# Patient Record
Sex: MALE | Race: BLACK OR AFRICAN AMERICAN | Employment: UNEMPLOYED | URBAN - METROPOLITAN AREA
[De-identification: names, ages, dates, MRNs, and addresses within clinical notes are randomized per-mention and may not be internally consistent; named-entity substitution may affect disease eponyms.]

---

## 2022-02-11 ENCOUNTER — OFFICE VISIT (OUTPATIENT)
Dept: RHEUMATOLOGY | Facility: CLINIC | Age: 37
End: 2022-02-11
Payer: MEDICAID

## 2022-02-11 VITALS
HEIGHT: 76 IN | WEIGHT: 240 LBS | SYSTOLIC BLOOD PRESSURE: 139 MMHG | BODY MASS INDEX: 29.22 KG/M2 | DIASTOLIC BLOOD PRESSURE: 100 MMHG

## 2022-02-11 DIAGNOSIS — F17.200 SMOKING: ICD-10-CM

## 2022-02-11 DIAGNOSIS — M19.90 INFLAMMATORY ARTHRITIS: ICD-10-CM

## 2022-02-11 DIAGNOSIS — Z11.1 SCREENING FOR TUBERCULOSIS: ICD-10-CM

## 2022-02-11 DIAGNOSIS — Z51.81 THERAPEUTIC DRUG MONITORING: ICD-10-CM

## 2022-02-11 DIAGNOSIS — R06.00 DOE (DYSPNEA ON EXERTION): ICD-10-CM

## 2022-02-11 DIAGNOSIS — Z11.59 NEED FOR HEPATITIS B SCREENING TEST: ICD-10-CM

## 2022-02-11 DIAGNOSIS — I42.9 CARDIOMYOPATHY, UNSPECIFIED TYPE (HCC): ICD-10-CM

## 2022-02-11 DIAGNOSIS — M34.9 SCLERODERMA (HCC): Primary | ICD-10-CM

## 2022-02-11 PROCEDURE — 3075F SYST BP GE 130 - 139MM HG: CPT | Performed by: INTERNAL MEDICINE

## 2022-02-11 PROCEDURE — 99245 OFF/OP CONSLTJ NEW/EST HI 55: CPT | Performed by: INTERNAL MEDICINE

## 2022-02-11 PROCEDURE — 3080F DIAST BP >= 90 MM HG: CPT | Performed by: INTERNAL MEDICINE

## 2022-02-11 PROCEDURE — 3008F BODY MASS INDEX DOCD: CPT | Performed by: INTERNAL MEDICINE

## 2022-02-11 RX ORDER — MYCOPHENOLATE MOFETIL 500 MG/1
1000 TABLET ORAL 2 TIMES DAILY
COMMUNITY

## 2022-02-11 RX ORDER — AMLODIPINE BESYLATE 5 MG/1
10 TABLET ORAL DAILY
COMMUNITY
Start: 2021-03-30

## 2022-02-11 RX ORDER — PANTOPRAZOLE SODIUM 40 MG/1
40 TABLET, DELAYED RELEASE ORAL
Qty: 90 TABLET | Refills: 0 | Status: SHIPPED | OUTPATIENT
Start: 2022-02-11

## 2022-02-11 RX ORDER — AMLODIPINE BESYLATE 5 MG/1
5 TABLET ORAL DAILY
Qty: 90 TABLET | Refills: 1 | Status: SHIPPED | OUTPATIENT
Start: 2022-02-11 | End: 2022-03-11

## 2022-02-11 RX ORDER — LOSARTAN POTASSIUM AND HYDROCHLOROTHIAZIDE 12.5; 1 MG/1; MG/1
1 TABLET ORAL DAILY
COMMUNITY
Start: 2022-02-08 | End: 2022-03-11

## 2022-02-11 RX ORDER — HYDROXYCHLOROQUINE SULFATE 200 MG/1
TABLET, FILM COATED ORAL
Qty: 180 TABLET | Refills: 1 | Status: SHIPPED | OUTPATIENT
Start: 2022-02-11

## 2022-02-11 RX ORDER — MYCOPHENOLIC ACID 360 MG/1
1080 TABLET, DELAYED RELEASE ORAL
Qty: 180 TABLET | Refills: 0 | Status: SHIPPED | OUTPATIENT
Start: 2022-02-11

## 2022-02-11 NOTE — PATIENT INSTRUCTIONS
Schedule echocardiogram and CT-Chest at [facility] or [facility]. Pantroprazole 40 mg (for reflux): Take 2 hours after dinner. Amlodipine (for Raynauds): 5 mg. Start this next Tuesday. Hydroxychloroquine (plaquenil) for arthrits. start: Take 1 tab daily for 2 weeks, and if tolerating, then increase to 1 tab twice daily. Continue cellcept: 2 pills twice daily. We will try to change over to myfortic (same medication, but less diarrhea)    Stop smoking.     ==============================================================================================================  More reading about scleroderma:  -[employer] [facility] Scleroderma: https://www. rheumatology. org/I-Am-A/Patient-Caregiver/Diseases-Conditions/Scleroderma  -[facility] Scleroderma: https://www.desouza.info/  -National Scleroderma Foundation: https://scleroderma.org/find-your-path/

## 2022-02-14 ENCOUNTER — TELEPHONE (OUTPATIENT)
Dept: RHEUMATOLOGY | Facility: CLINIC | Age: 37
End: 2022-02-14

## 2022-02-14 DIAGNOSIS — M34.9 SCLERODERMA (HCC): ICD-10-CM

## 2022-02-14 DIAGNOSIS — Z51.81 THERAPEUTIC DRUG MONITORING: Primary | ICD-10-CM

## 2022-02-14 NOTE — TELEPHONE ENCOUNTER
Please call pt, labs stable. Will need repeat labs to monitor for cellcept drug toxicity prior to next RTC in 1 month.  Please fax over to [facility] or mail lab orders to patient      Labs from 2/8/2022    Creat 1.10, rest of CMP is normal  8.4 >14.9 <332    OPAL by IFA 1:80 speckled  TSH wnl

## 2022-02-14 NOTE — TELEPHONE ENCOUNTER
Can you let patient know to continue current medications, once myfortic is approved, he will stop cellcept. However it will take some time, so continue cellcept for the time being.

## 2022-02-15 ENCOUNTER — TELEPHONE (OUTPATIENT)
Dept: RHEUMATOLOGY | Facility: CLINIC | Age: 37
End: 2022-02-15

## 2022-02-16 NOTE — TELEPHONE ENCOUNTER
Pt returned call; reviewed notes per Dr Mendez Codding below. Pt verbalized understanding and agreed to this plan.

## 2022-02-17 ENCOUNTER — TELEPHONE (OUTPATIENT)
Dept: RHEUMATOLOGY | Facility: CLINIC | Age: 37
End: 2022-02-17

## 2022-02-17 LAB
ABSOLUTE BASOPHILS: 49 CELLS/UL (ref 0–200)
ABSOLUTE EOSINOPHILS: 74 CELLS/UL (ref 15–500)
ABSOLUTE LYMPHOCYTES: 2255 CELLS/UL (ref 850–3900)
ABSOLUTE MONOCYTES: 968 CELLS/UL (ref 200–950)
ABSOLUTE NEUTROPHILS: 4854 CELLS/UL (ref 1500–7800)
ALBUMIN/GLOBULIN RATIO: 1.4 (CALC) (ref 1–2.5)
ALBUMIN: 5 G/DL (ref 3.6–5.1)
ALKALINE PHOSPHATASE: 69 U/L (ref 36–130)
ALT: 19 U/L (ref 9–46)
AST: 23 U/L (ref 10–40)
BASOPHILS: 0.6 %
BILIRUBIN, TOTAL: 0.6 MG/DL (ref 0.2–1.2)
BILIRUBIN: NEGATIVE
BUN: 21 MG/DL (ref 7–25)
C-REACTIVE PROTEIN: 1.1 MG/L
CALCIUM: 11 MG/DL (ref 8.6–10.3)
CARBON DIOXIDE: 24 MMOL/L (ref 20–32)
CHLORIDE: 100 MMOL/L (ref 98–110)
COLOR: YELLOW
CREATINE KINASE, TOTAL: 346 U/L (ref 44–196)
CREATININE, RANDOM URINE: 162 MG/DL (ref 20–320)
CREATININE: 1.29 MG/DL (ref 0.6–1.35)
EGFR IF AFRICN AM: 82 ML/MIN/1.73M2
EGFR IF NONAFRICN AM: 70 ML/MIN/1.73M2
EOSINOPHILS: 0.9 %
GLOBULIN: 3.5 G/DL (CALC) (ref 1.9–3.7)
GLUCOSE: 85 MG/DL (ref 65–139)
GLUCOSE: NEGATIVE
HEMATOCRIT: 46.7 % (ref 38.5–50)
HEMOGLOBIN: 15.9 G/DL (ref 13.2–17.1)
LD: 136 U/L (ref 100–220)
LEUKOCYTE ESTERASE: NEGATIVE
LYMPHOCYTES: 27.5 %
MCH: 31.2 PG (ref 27–33)
MCHC: 34 G/DL (ref 32–36)
MCV: 91.7 FL (ref 80–100)
MONOCYTES: 11.8 %
MPV: 9.3 FL (ref 7.5–12.5)
NEUTROPHILS: 59.2 %
NITRITE: NEGATIVE
OCCULT BLOOD: NEGATIVE
PLATELET COUNT: 319 THOUSAND/UL (ref 140–400)
POTASSIUM: 4.1 MMOL/L (ref 3.5–5.3)
PROTEIN, TOTAL, RANDOM UR: 24 MG/DL (ref 5–25)
PROTEIN, TOTAL: 8.5 G/DL (ref 6.1–8.1)
RDW: 13 % (ref 11–15)
RED BLOOD CELL COUNT: 5.09 MILLION/UL (ref 4.2–5.8)
SED RATE BY MODIFIED$WESTERGREN: 6 MM/H
SODIUM: 135 MMOL/L (ref 135–146)
SPECIFIC GRAVITY: 1.01 (ref 1–1.03)
WHITE BLOOD CELL COUNT: 8.2 THOUSAND/UL (ref 3.8–10.8)

## 2022-02-17 NOTE — TELEPHONE ENCOUNTER
PA for CT chest started with [employer].  Clinicals required and faxed to [phone]  Case# [id number]

## 2022-02-21 NOTE — PROGRESS NOTES
Phoned [employer], our office still requesting PA form to complete myfortic. Sent to wrong fax number, will send again today.

## 2022-02-21 NOTE — TELEPHONE ENCOUNTER
Pa for CT chest approved per health plan. Effective 2/20/22-8/19/22  ID #[id number]  Phoned pt,LVM notified of approval and to call central scheduling to schedule.

## 2022-02-22 ENCOUNTER — TELEPHONE (OUTPATIENT)
Dept: RHEUMATOLOGY | Facility: CLINIC | Age: 37
End: 2022-02-22

## 2022-02-22 NOTE — TELEPHONE ENCOUNTER
Cesar Alas, patient is on mycophenolate mofetil already, but has significant diarrhea. Was trying to get mycophenolic acid (myfortic) approved, same medication, but different formulation. Much easier on the intestines. Trying to get 1080 mg BID dose approved. Does this not require an auth either? It usually does in my experience.

## 2022-03-02 ENCOUNTER — HOSPITAL ENCOUNTER (OUTPATIENT)
Dept: CT IMAGING | Facility: HOSPITAL | Age: 37
Discharge: HOME | End: 2022-03-02
Attending: INTERNAL MEDICINE
Payer: MEDICAID

## 2022-03-02 DIAGNOSIS — R06.00 DOE (DYSPNEA ON EXERTION): ICD-10-CM

## 2022-03-02 DIAGNOSIS — M34.9 SCLERODERMA (HCC): ICD-10-CM

## 2022-03-02 PROCEDURE — 71250 CT THORAX DX C-: CPT | Performed by: INTERNAL MEDICINE

## 2022-03-04 ENCOUNTER — HOSPITAL ENCOUNTER (OUTPATIENT)
Dept: CV DIAGNOSTICS | Facility: HOSPITAL | Age: 37
Discharge: HOME | End: 2022-03-04
Attending: INTERNAL MEDICINE
Payer: MEDICAID

## 2022-03-04 DIAGNOSIS — M34.9 SCLERODERMA (HCC): ICD-10-CM

## 2022-03-04 DIAGNOSIS — I42.9 CARDIOMYOPATHY, UNSPECIFIED TYPE (HCC): ICD-10-CM

## 2022-03-04 DIAGNOSIS — R06.00 DOE (DYSPNEA ON EXERTION): ICD-10-CM

## 2022-03-04 PROCEDURE — 93306 TTE W/DOPPLER COMPLETE: CPT | Performed by: INTERNAL MEDICINE

## 2022-03-04 NOTE — PROGRESS NOTES
Mild, small blebs likely more from smoking. Nothing to stress about. These changes are not from scleroderma.

## 2022-03-07 NOTE — PROGRESS NOTES
Great news, essentially normal ultrasound of the heart, will chat more about this at our next appointment.

## 2022-03-11 ENCOUNTER — OFFICE VISIT (OUTPATIENT)
Dept: RHEUMATOLOGY | Facility: CLINIC | Age: 37
End: 2022-03-11
Payer: MEDICAID

## 2022-03-11 VITALS
HEART RATE: 53 BPM | BODY MASS INDEX: 29.22 KG/M2 | OXYGEN SATURATION: 98 % | HEIGHT: 76 IN | WEIGHT: 240 LBS | SYSTOLIC BLOOD PRESSURE: 122 MMHG | DIASTOLIC BLOOD PRESSURE: 81 MMHG

## 2022-03-11 DIAGNOSIS — M34.9 SCLERODERMA (HCC): Primary | ICD-10-CM

## 2022-03-11 DIAGNOSIS — I73.00 RAYNAUD'S DISEASE WITHOUT GANGRENE: ICD-10-CM

## 2022-03-11 DIAGNOSIS — K21.9 GASTROESOPHAGEAL REFLUX DISEASE, UNSPECIFIED WHETHER ESOPHAGITIS PRESENT: ICD-10-CM

## 2022-03-11 DIAGNOSIS — I10 PRIMARY HYPERTENSION: ICD-10-CM

## 2022-03-11 DIAGNOSIS — Z51.81 THERAPEUTIC DRUG MONITORING: ICD-10-CM

## 2022-03-11 PROCEDURE — 3074F SYST BP LT 130 MM HG: CPT | Performed by: INTERNAL MEDICINE

## 2022-03-11 PROCEDURE — 99215 OFFICE O/P EST HI 40 MIN: CPT | Performed by: INTERNAL MEDICINE

## 2022-03-11 PROCEDURE — 3008F BODY MASS INDEX DOCD: CPT | Performed by: INTERNAL MEDICINE

## 2022-03-11 PROCEDURE — 3079F DIAST BP 80-89 MM HG: CPT | Performed by: INTERNAL MEDICINE

## 2022-03-11 RX ORDER — LOSARTAN POTASSIUM 100 MG/1
100 TABLET ORAL DAILY
Qty: 90 TABLET | Refills: 1 | Status: SHIPPED | OUTPATIENT
Start: 2022-03-11

## 2022-03-11 RX ORDER — AMLODIPINE BESYLATE 10 MG/1
10 TABLET ORAL DAILY
Qty: 90 TABLET | Refills: 1 | Status: SHIPPED | OUTPATIENT
Start: 2022-03-11 | End: 2023-03-06

## 2022-03-11 NOTE — PATIENT INSTRUCTIONS
-Call Dr. Francisca Gross about getting chantix. -repeat blood pressure and send us a message or call on March 18th    -Get repeat blood work (CMP and CBC) in early April at [facility]    -Amlodipine: increase to 10 mg daily, either 2x of the 5 mg tablets or 1 of the 10 mg tablets.  -Stop combo pill losartan-hydrochlorothiazide: start taking losartan only pill.  100 mg daily.   -cont hydroxychloroquine (plaquenil) 200 mg twice daily  -continue myfortic (mycophenolic sodium) 8391 mg (3 pills) in the morning and  1080 mg (3 pills) in the evening.   -continue protonix 40 mg daily.     -think about covid vaccine

## 2022-03-12 PROBLEM — I10 PRIMARY HYPERTENSION: Status: ACTIVE | Noted: 2022-03-12

## 2022-03-12 PROBLEM — M34.9 SCLERODERMA (HCC): Status: ACTIVE | Noted: 2022-03-12

## 2022-03-15 NOTE — PROGRESS NOTES
Phoned pt, pt has stopped losartan/hydrochlorothiazide and currently taking losartan 100 mg and amlopdipine 10 mg (increased from 5 mg) daily. Has not taken BP sent Sat. Reminded him to get daily Bps and log them. Pt BP on Sat was 128/81 and Friday was 115/86. Pt voiced understanding of today's call.

## 2022-04-06 RX ORDER — MYCOPHENOLIC ACID 360 MG/1
TABLET, DELAYED RELEASE ORAL
Qty: 180 TABLET | Refills: 0 | Status: SHIPPED | OUTPATIENT
Start: 2022-04-06 | End: 2022-04-11

## 2022-04-06 NOTE — TELEPHONE ENCOUNTER
LOV 3-11-22  Future Appointments   Date Time Provider Tom Denisse   5/17/2022 11:30 AM Angela Barrow MD [facility]

## 2022-04-06 NOTE — TELEPHONE ENCOUNTER
Called pt per notes below from Dr Edgar Burr; pt states he already scheduled an appt for labs next week.

## 2022-04-09 LAB
ABSOLUTE BASOPHILS: 49 CELLS/UL (ref 0–200)
ABSOLUTE EOSINOPHILS: 74 CELLS/UL (ref 15–500)
ABSOLUTE LYMPHOCYTES: 2288 CELLS/UL (ref 850–3900)
ABSOLUTE MONOCYTES: 869 CELLS/UL (ref 200–950)
ABSOLUTE NEUTROPHILS: 4920 CELLS/UL (ref 1500–7800)
ALBUMIN/GLOBULIN RATIO: 1.4 (CALC) (ref 1–2.5)
ALBUMIN: 4.6 G/DL (ref 3.6–5.1)
ALKALINE PHOSPHATASE: 63 U/L (ref 36–130)
ALT: 13 U/L (ref 9–46)
AST: 16 U/L (ref 10–40)
BASOPHILS: 0.6 %
BILIRUBIN, TOTAL: 0.4 MG/DL (ref 0.2–1.2)
BUN: 15 MG/DL (ref 7–25)
CALCIUM: 10.4 MG/DL (ref 8.6–10.3)
CARBON DIOXIDE: 25 MMOL/L (ref 20–32)
CHLORIDE: 106 MMOL/L (ref 98–110)
CREATININE: 1.12 MG/DL (ref 0.6–1.35)
EGFR IF AFRICN AM: 97 ML/MIN/1.73M2
EGFR IF NONAFRICN AM: 83 ML/MIN/1.73M2
EOSINOPHILS: 0.9 %
GLOBULIN: 3.2 G/DL (CALC) (ref 1.9–3.7)
GLUCOSE: 103 MG/DL (ref 65–139)
HEMATOCRIT: 42.3 % (ref 38.5–50)
HEMOGLOBIN: 14.3 G/DL (ref 13.2–17.1)
LYMPHOCYTES: 27.9 %
MCH: 31 PG (ref 27–33)
MCHC: 33.8 G/DL (ref 32–36)
MCV: 91.6 FL (ref 80–100)
MONOCYTES: 10.6 %
MPV: 9.4 FL (ref 7.5–12.5)
NEUTROPHILS: 60 %
PLATELET COUNT: 286 THOUSAND/UL (ref 140–400)
POTASSIUM: 3.8 MMOL/L (ref 3.5–5.3)
PROTEIN, TOTAL: 7.8 G/DL (ref 6.1–8.1)
RDW: 13.4 % (ref 11–15)
RED BLOOD CELL COUNT: 4.62 MILLION/UL (ref 4.2–5.8)
SODIUM: 141 MMOL/L (ref 135–146)
WHITE BLOOD CELL COUNT: 8.2 THOUSAND/UL (ref 3.8–10.8)

## 2022-04-11 ENCOUNTER — TELEPHONE (OUTPATIENT)
Dept: RHEUMATOLOGY | Facility: CLINIC | Age: 37
End: 2022-04-11

## 2022-04-11 RX ORDER — MYCOPHENOLIC ACID 360 MG/1
1080 TABLET, DELAYED RELEASE ORAL
Qty: 540 TABLET | Refills: 0 | Status: SHIPPED | OUTPATIENT
Start: 2022-04-11 | End: 2022-07-10

## 2022-04-12 NOTE — PROGRESS NOTES
Blood count, liver tests, kidney function are stable. Overall, labs are stable. Continue current mediactions, I refilled your myfortic.

## 2022-05-17 ENCOUNTER — OFFICE VISIT (OUTPATIENT)
Dept: RHEUMATOLOGY | Facility: CLINIC | Age: 37
End: 2022-05-17
Payer: MEDICAID

## 2022-05-17 VITALS
WEIGHT: 250 LBS | HEIGHT: 76 IN | HEART RATE: 100 BPM | SYSTOLIC BLOOD PRESSURE: 121 MMHG | DIASTOLIC BLOOD PRESSURE: 92 MMHG | OXYGEN SATURATION: 97 % | BODY MASS INDEX: 30.44 KG/M2

## 2022-05-17 DIAGNOSIS — K29.70 GASTRITIS WITHOUT BLEEDING, UNSPECIFIED CHRONICITY, UNSPECIFIED GASTRITIS TYPE: ICD-10-CM

## 2022-05-17 DIAGNOSIS — M34.9 SCLERODERMA (HCC): Primary | ICD-10-CM

## 2022-05-17 DIAGNOSIS — K21.9 GASTROESOPHAGEAL REFLUX DISEASE, UNSPECIFIED WHETHER ESOPHAGITIS PRESENT: ICD-10-CM

## 2022-05-17 DIAGNOSIS — M19.90 INFLAMMATORY ARTHRITIS: ICD-10-CM

## 2022-05-17 DIAGNOSIS — I10 PRIMARY HYPERTENSION: ICD-10-CM

## 2022-05-17 DIAGNOSIS — Z51.81 THERAPEUTIC DRUG MONITORING: ICD-10-CM

## 2022-05-17 PROCEDURE — 3008F BODY MASS INDEX DOCD: CPT | Performed by: INTERNAL MEDICINE

## 2022-05-17 PROCEDURE — 3080F DIAST BP >= 90 MM HG: CPT | Performed by: INTERNAL MEDICINE

## 2022-05-17 PROCEDURE — 99215 OFFICE O/P EST HI 40 MIN: CPT | Performed by: INTERNAL MEDICINE

## 2022-05-17 PROCEDURE — 3074F SYST BP LT 130 MM HG: CPT | Performed by: INTERNAL MEDICINE

## 2022-05-17 RX ORDER — PANTOPRAZOLE SODIUM 40 MG/1
40 TABLET, DELAYED RELEASE ORAL
Qty: 90 TABLET | Refills: 1 | Status: SHIPPED | OUTPATIENT
Start: 2022-05-17

## 2022-05-17 RX ORDER — HYDROXYCHLOROQUINE SULFATE 200 MG/1
TABLET, FILM COATED ORAL
Qty: 180 TABLET | Refills: 1 | Status: SHIPPED | OUTPATIENT
Start: 2022-05-17

## 2022-05-17 RX ORDER — AMLODIPINE BESYLATE 10 MG/1
10 TABLET ORAL DAILY
Qty: 90 TABLET | Refills: 1 | Status: SHIPPED | OUTPATIENT
Start: 2022-05-17 | End: 2022-11-13

## 2022-05-17 RX ORDER — LOSARTAN POTASSIUM 100 MG/1
100 TABLET ORAL DAILY
Qty: 90 TABLET | Refills: 1 | Status: SHIPPED | OUTPATIENT
Start: 2022-05-17

## 2022-05-17 RX ORDER — MYCOPHENOLIC ACID 360 MG/1
1080 TABLET, DELAYED RELEASE ORAL
Qty: 540 TABLET | Refills: 0 | Status: SHIPPED | OUTPATIENT
Start: 2022-05-17 | End: 2022-08-15

## 2022-05-17 NOTE — PATIENT INSTRUCTIONS
-Get repeat blood work (CMP and CBC) in mild/late June at [facility]  -cont hydroxychloroquine (plaquenil) 200 mg twice daily for inflammatory arthritis  -continue myfortic (mycophenolic sodium) 2789 mg (3 pills) in the morning and  1080 mg (3 pills) in the evening.    -continue protonix 40 mg daily for GERD  -continue Amlodipine 10 mg daily and Losartan 100 mg daily

## 2022-07-21 ENCOUNTER — TELEPHONE (OUTPATIENT)
Dept: RHEUMATOLOGY | Facility: CLINIC | Age: 37
End: 2022-07-21

## 2022-07-25 NOTE — TELEPHONE ENCOUNTER
Please call patient and get more information. I am not clear what a disability letter is. Does he need short or long term or SS disability?  Or FMLA

## 2022-07-25 NOTE — TELEPHONE ENCOUNTER
Phoned pt, currently not working- facility closed down. Pt states he is looking for social security benefits, medicaid previously denied. PCP advised him to contact our office.

## 2022-07-25 NOTE — TELEPHONE ENCOUNTER
Please format a [facility] msg and/or call patient with the following information. Patient should file for unemployment benefits below. Consider short-term and long-term disability if that is an option through previous employer. SSD process is usually through PCP, though I can help. The letter can help, but it depends where the patient is in the process. Has he already started this process? See below. General Process for filing for Social Security Disability:  http://azar-hernandez.info/. html    Forms needed for Social Security Disability:   [email or web address]    A Residual Functional Capacity (RFC) form will be needed to be completed. A physical therapist will need to assess this and provide the actual objective evaluation as our rheumatology clinic does not have the equipment to assess the [address]. For example, the [address] form asks if a patient can specifically lift 10, 20, 50 or 100 lbs or more.

## 2022-07-25 NOTE — TELEPHONE ENCOUNTER
Pt called again requesting disability letter from Dr Jose Luis Marsh. Has a new phone, lm on  if necessary.     LOV 5-17-22  Future Appointments   Date Time Provider Tom Salcedo   8/23/2022  1:30 PM Mortimer Mallick, MD [facility]

## 2022-08-22 ENCOUNTER — OFFICE VISIT (OUTPATIENT)
Dept: RHEUMATOLOGY | Facility: CLINIC | Age: 37
End: 2022-08-22
Payer: MEDICAID

## 2022-08-22 VITALS
OXYGEN SATURATION: 99 % | BODY MASS INDEX: 28.51 KG/M2 | DIASTOLIC BLOOD PRESSURE: 90 MMHG | TEMPERATURE: 98 F | HEART RATE: 92 BPM | SYSTOLIC BLOOD PRESSURE: 128 MMHG | WEIGHT: 234.13 LBS | HEIGHT: 76 IN

## 2022-08-22 DIAGNOSIS — M06.00 SERONEGATIVE RHEUMATOID ARTHRITIS (HCC): ICD-10-CM

## 2022-08-22 DIAGNOSIS — K29.70 GASTRITIS WITHOUT BLEEDING, UNSPECIFIED CHRONICITY, UNSPECIFIED GASTRITIS TYPE: ICD-10-CM

## 2022-08-22 DIAGNOSIS — Z51.81 THERAPEUTIC DRUG MONITORING: ICD-10-CM

## 2022-08-22 DIAGNOSIS — K21.9 GASTROESOPHAGEAL REFLUX DISEASE, UNSPECIFIED WHETHER ESOPHAGITIS PRESENT: ICD-10-CM

## 2022-08-22 DIAGNOSIS — I73.00 RAYNAUD'S DISEASE WITHOUT GANGRENE: ICD-10-CM

## 2022-08-22 DIAGNOSIS — M34.9 SCLERODERMA (HCC): Primary | ICD-10-CM

## 2022-08-22 RX ORDER — OMEPRAZOLE 40 MG/1
40 CAPSULE, DELAYED RELEASE ORAL
Qty: 90 CAPSULE | Refills: 3 | Status: SHIPPED | OUTPATIENT
Start: 2022-08-22 | End: 2023-08-17

## 2022-08-22 RX ORDER — LEFLUNOMIDE 20 MG/1
20 TABLET ORAL DAILY
Qty: 30 TABLET | Refills: 0 | Status: SHIPPED | OUTPATIENT
Start: 2022-08-22 | End: 2022-09-21

## 2022-08-22 RX ORDER — MYCOPHENOLIC ACID 360 MG/1
1080 TABLET, DELAYED RELEASE ORAL
Qty: 540 TABLET | Refills: 0 | OUTPATIENT
Start: 2022-08-22 | End: 2022-11-20

## 2022-08-22 NOTE — PATIENT INSTRUCTIONS
Start leflunomide: 0.5 tablets (10 mg total) by mouth 1 (one) time each day for 10 days, THEN 1 tablet (20 mg total) 1 (one) time each day. Get bloodwork (CMP, CBC w/ diff) 4 weeks after starting medication.  Watch out for elevated blood pressure and diarrhea with this medication.     -cont hydroxychloroquine (plaquenil) 200 mg twice daily for inflammatory arthritis  -continue myfortic (mycophenolic sodium) 1416 mg (3 pills) in the morning and  1080 mg (3 pills) in the evening.    -stop protonix 40 mg daily and start omeprazole 40 mg daily (30 minutes before breakfast) for GERD  -continue Amlodipine 10 mg daily and Losartan 100 mg daily

## 2022-09-21 ENCOUNTER — TELEPHONE (OUTPATIENT)
Dept: RHEUMATOLOGY | Facility: CLINIC | Age: 37
End: 2022-09-21

## 2022-09-21 DIAGNOSIS — M34.9 SCLERODERMA (HCC): ICD-10-CM

## 2022-09-21 DIAGNOSIS — K29.70 GASTRITIS WITHOUT BLEEDING, UNSPECIFIED CHRONICITY, UNSPECIFIED GASTRITIS TYPE: ICD-10-CM

## 2022-09-21 DIAGNOSIS — K21.9 GASTROESOPHAGEAL REFLUX DISEASE, UNSPECIFIED WHETHER ESOPHAGITIS PRESENT: ICD-10-CM

## 2022-09-21 RX ORDER — PANTOPRAZOLE SODIUM 40 MG/1
40 TABLET, DELAYED RELEASE ORAL
Qty: 90 TABLET | Refills: 1 | Status: SHIPPED | OUTPATIENT
Start: 2022-09-21

## 2022-09-21 NOTE — TELEPHONE ENCOUNTER
Future Appointments   Date Time Provider Tom Denisse   11/21/2022  2:00 PM Viktoria Prakash MD [facility] [place]     Last office visit 8/22/2022  Last labs taken 4/8/2022

## 2022-09-23 ENCOUNTER — TELEPHONE (OUTPATIENT)
Dept: RHEUMATOLOGY | Facility: CLINIC | Age: 37
End: 2022-09-23

## 2022-10-05 LAB
ABSOLUTE BASOPHILS: 70 CELLS/UL (ref 0–200)
ABSOLUTE EOSINOPHILS: 26 CELLS/UL (ref 15–500)
ABSOLUTE LYMPHOCYTES: 2112 CELLS/UL (ref 850–3900)
ABSOLUTE MONOCYTES: 1109 CELLS/UL (ref 200–950)
ABSOLUTE NEUTROPHILS: 5482 CELLS/UL (ref 1500–7800)
ALBUMIN/GLOBULIN RATIO: 1.5 (CALC) (ref 1–2.5)
ALBUMIN: 4.9 G/DL (ref 3.6–5.1)
ALKALINE PHOSPHATASE: 70 U/L (ref 36–130)
ALT: 19 U/L (ref 9–46)
AST: 20 U/L (ref 10–40)
BASOPHILS: 0.8 %
BILIRUBIN, TOTAL: 0.7 MG/DL (ref 0.2–1.2)
BUN: 15 MG/DL (ref 7–25)
CALCIUM: 10.9 MG/DL (ref 8.6–10.3)
CARBON DIOXIDE: 27 MMOL/L (ref 20–32)
CHLORIDE: 99 MMOL/L (ref 98–110)
CREATININE: 1.23 MG/DL (ref 0.6–1.26)
EGFR: 78 ML/MIN/1.73M2
EOSINOPHILS: 0.3 %
GLOBULIN: 3.2 G/DL (CALC) (ref 1.9–3.7)
GLUCOSE: 124 MG/DL (ref 65–99)
HEMATOCRIT: 46 % (ref 38.5–50)
HEMOGLOBIN: 15.5 G/DL (ref 13.2–17.1)
LYMPHOCYTES: 24 %
MCH: 30.5 PG (ref 27–33)
MCHC: 33.7 G/DL (ref 32–36)
MCV: 90.6 FL (ref 80–100)
MONOCYTES: 12.6 %
MPV: 9.6 FL (ref 7.5–12.5)
NEUTROPHILS: 62.3 %
PLATELET COUNT: 299 THOUSAND/UL (ref 140–400)
POTASSIUM: 3.7 MMOL/L (ref 3.5–5.3)
PROTEIN, TOTAL: 8.1 G/DL (ref 6.1–8.1)
RDW: 12.9 % (ref 11–15)
RED BLOOD CELL COUNT: 5.08 MILLION/UL (ref 4.2–5.8)
SODIUM: 136 MMOL/L (ref 135–146)
WHITE BLOOD CELL COUNT: 8.8 THOUSAND/UL (ref 3.8–10.8)

## 2022-10-25 DIAGNOSIS — M34.9 SCLERODERMA (HCC): ICD-10-CM

## 2022-10-25 DIAGNOSIS — M06.00 SERONEGATIVE RHEUMATOID ARTHRITIS (HCC): ICD-10-CM

## 2022-10-25 DIAGNOSIS — K29.70 GASTRITIS WITHOUT BLEEDING, UNSPECIFIED CHRONICITY, UNSPECIFIED GASTRITIS TYPE: ICD-10-CM

## 2022-10-25 RX ORDER — LEFLUNOMIDE 20 MG/1
TABLET ORAL
Qty: 30 TABLET | Refills: 0 | Status: SHIPPED | OUTPATIENT
Start: 2022-10-25

## 2022-10-25 NOTE — TELEPHONE ENCOUNTER
Future Appointments   Date Time Provider Tom Salcedo   11/21/2022  2:00 PM [facility] [place]     Last office visit 8/22/2022  Last labs taken 10/4/2022

## 2022-11-13 DIAGNOSIS — I10 PRIMARY HYPERTENSION: ICD-10-CM

## 2022-11-13 DIAGNOSIS — K29.70 GASTRITIS WITHOUT BLEEDING, UNSPECIFIED CHRONICITY, UNSPECIFIED GASTRITIS TYPE: ICD-10-CM

## 2022-11-13 DIAGNOSIS — M34.9 SCLERODERMA (HCC): ICD-10-CM

## 2022-11-14 RX ORDER — AMLODIPINE BESYLATE 10 MG/1
TABLET ORAL
Qty: 90 TABLET | Refills: 1 | Status: SHIPPED | OUTPATIENT
Start: 2022-11-14

## 2022-11-14 RX ORDER — LOSARTAN POTASSIUM 100 MG/1
TABLET ORAL
Qty: 90 TABLET | Refills: 1 | Status: SHIPPED | OUTPATIENT
Start: 2022-11-14

## 2022-11-14 NOTE — TELEPHONE ENCOUNTER
Future Appointments   Date Time Provider Tom Denisse   11/21/2022  2:00 PM Rupa Fuller MD [facility] [place]     LOV 8/22/22  Labs completed 10/4/22

## 2022-11-21 ENCOUNTER — OFFICE VISIT (OUTPATIENT)
Dept: RHEUMATOLOGY | Facility: CLINIC | Age: 37
End: 2022-11-21
Payer: MEDICAID

## 2022-11-21 VITALS
HEART RATE: 96 BPM | BODY MASS INDEX: 28.62 KG/M2 | RESPIRATION RATE: 14 BRPM | DIASTOLIC BLOOD PRESSURE: 72 MMHG | TEMPERATURE: 98 F | OXYGEN SATURATION: 96 % | SYSTOLIC BLOOD PRESSURE: 118 MMHG | HEIGHT: 76 IN | WEIGHT: 235 LBS

## 2022-11-21 DIAGNOSIS — Z51.81 THERAPEUTIC DRUG MONITORING: ICD-10-CM

## 2022-11-21 DIAGNOSIS — K29.70 GASTRITIS WITHOUT BLEEDING, UNSPECIFIED CHRONICITY, UNSPECIFIED GASTRITIS TYPE: ICD-10-CM

## 2022-11-21 DIAGNOSIS — M06.00 SERONEGATIVE RHEUMATOID ARTHRITIS (HCC): ICD-10-CM

## 2022-11-21 DIAGNOSIS — R06.09 DOE (DYSPNEA ON EXERTION): ICD-10-CM

## 2022-11-21 DIAGNOSIS — M34.9 SCLERODERMA (HCC): Primary | ICD-10-CM

## 2022-11-21 DIAGNOSIS — I73.00 RAYNAUD'S DISEASE WITHOUT GANGRENE: ICD-10-CM

## 2022-11-21 PROCEDURE — 3078F DIAST BP <80 MM HG: CPT | Performed by: INTERNAL MEDICINE

## 2022-11-21 PROCEDURE — 3074F SYST BP LT 130 MM HG: CPT | Performed by: INTERNAL MEDICINE

## 2022-11-21 PROCEDURE — 99215 OFFICE O/P EST HI 40 MIN: CPT | Performed by: INTERNAL MEDICINE

## 2022-11-21 PROCEDURE — 3008F BODY MASS INDEX DOCD: CPT | Performed by: INTERNAL MEDICINE

## 2022-11-21 RX ORDER — UREA 40 %
1 CREAM (GRAM) TOPICAL DAILY PRN
COMMUNITY
Start: 2022-07-14

## 2022-11-21 RX ORDER — MYCOPHENOLIC ACID 360 MG/1
1080 TABLET, DELAYED RELEASE ORAL
COMMUNITY

## 2022-11-21 RX ORDER — HYDROXYCHLOROQUINE SULFATE 200 MG/1
TABLET, FILM COATED ORAL
Qty: 180 TABLET | Refills: 1 | Status: SHIPPED | OUTPATIENT
Start: 2022-11-21

## 2022-11-21 RX ORDER — LEFLUNOMIDE 20 MG/1
20 TABLET ORAL DAILY
Qty: 90 TABLET | Refills: 0 | Status: SHIPPED | OUTPATIENT
Start: 2022-11-21

## 2022-11-21 NOTE — PATIENT INSTRUCTIONS
-get repeat blood work in early Jan 2023  -Get repeat pulmonary functions tests in Feb 2023  -Schedule an eye exam with ophthalmologist in your network, tell them you are on hydroxychloroquine (plaquenil) . -Look into Prilosec [omeprazole] (very similar to pantoprazole) over-the-counter at [facility].    -Get Covid vaccine at [facility]    -continue leflunomide 20 mg daily  -cont hydroxychloroquine (plaquenil) 200 mg twice daily for inflammatory arthritis  -continue myfortic (mycophenolic sodium) 6831 mg (3 pills) in the morning and  1080 mg (3 pills) in the evening.    -continue Amlodipine 10 mg daily and Losartan 100 mg daily   -stop protonix 40 mg daily (since not covered any more by insurance) and start omeprazole 40 mg daily (30 minutes before breakfast) for GERD  -daily blood pressure

## 2023-02-16 LAB
ABSOLUTE BASOPHILS: 40 CELLS/UL (ref 0–200)
ABSOLUTE EOSINOPHILS: 72 CELLS/UL (ref 15–500)
ABSOLUTE LYMPHOCYTES: 1672 CELLS/UL (ref 850–3900)
ABSOLUTE MONOCYTES: 1096 CELLS/UL (ref 200–950)
ABSOLUTE NEUTROPHILS: 5120 CELLS/UL (ref 1500–7800)
ALBUMIN/GLOBULIN RATIO: 2 (CALC) (ref 1–2.5)
ALBUMIN: 4.9 G/DL (ref 3.6–5.1)
ALKALINE PHOSPHATASE: 63 U/L (ref 36–130)
ALT: 15 U/L (ref 9–46)
APPEARANCE: CLEAR
AST: 20 U/L (ref 10–40)
BASOPHILS: 0.5 %
BILIRUBIN, TOTAL: 0.5 MG/DL (ref 0.2–1.2)
BILIRUBIN: NEGATIVE
BUN/CREATININE RATIO: 11 (CALC) (ref 6–22)
BUN: 14 MG/DL (ref 7–25)
C-REACTIVE PROTEIN: 2.1 MG/L
CALCIUM: 11 MG/DL (ref 8.6–10.3)
CARBON DIOXIDE: 30 MMOL/L (ref 20–32)
CHLORIDE: 103 MMOL/L (ref 98–110)
COLOR: YELLOW
CREATINE KINASE, TOTAL: 354 U/L (ref 44–196)
CREATININE, RANDOM URINE: 190 MG/DL (ref 20–320)
CREATININE: 1.27 MG/DL (ref 0.6–1.26)
EGFR: 74 ML/MIN/1.73M2
EOSINOPHILS: 0.9 %
GLOBULIN: 2.5 G/DL (CALC) (ref 1.9–3.7)
GLUCOSE: 85 MG/DL (ref 65–99)
GLUCOSE: NEGATIVE
HEMATOCRIT: 45.4 % (ref 38.5–50)
HEMOGLOBIN: 14.7 G/DL (ref 13.2–17.1)
LEUKOCYTE ESTERASE: NEGATIVE
LYMPHOCYTES: 20.9 %
MCH: 29.2 PG (ref 27–33)
MCHC: 32.4 G/DL (ref 32–36)
MCV: 90.1 FL (ref 80–100)
MONOCYTES: 13.7 %
MPV: 9.3 FL (ref 7.5–12.5)
NEUTROPHILS: 64 %
NITRITE: NEGATIVE
OCCULT BLOOD: NEGATIVE
PH: 5.5 (ref 5–8)
PLATELET COUNT: 362 THOUSAND/UL (ref 140–400)
POTASSIUM: 4.1 MMOL/L (ref 3.5–5.3)
PROTEIN, TOTAL, RANDOM UR: 12 MG/DL (ref 5–25)
PROTEIN, TOTAL: 7.4 G/DL (ref 6.1–8.1)
PROTEIN: NEGATIVE
RDW: 13.1 % (ref 11–15)
RED BLOOD CELL COUNT: 5.04 MILLION/UL (ref 4.2–5.8)
SED RATE BY MODIFIED$WESTERGREN: 28 MM/H
SODIUM: 142 MMOL/L (ref 135–146)
SPECIFIC GRAVITY: 1.02 (ref 1–1.03)
WHITE BLOOD CELL COUNT: 8 THOUSAND/UL (ref 3.8–10.8)

## 2023-02-22 DIAGNOSIS — M06.00 SERONEGATIVE RHEUMATOID ARTHRITIS (HCC): ICD-10-CM

## 2023-02-22 DIAGNOSIS — M34.9 SCLERODERMA (HCC): ICD-10-CM

## 2023-02-22 DIAGNOSIS — K29.70 GASTRITIS WITHOUT BLEEDING, UNSPECIFIED CHRONICITY, UNSPECIFIED GASTRITIS TYPE: ICD-10-CM

## 2023-02-22 RX ORDER — LEFLUNOMIDE 20 MG/1
TABLET ORAL
Qty: 90 TABLET | Refills: 0 | Status: SHIPPED | OUTPATIENT
Start: 2023-02-22

## 2023-02-22 NOTE — TELEPHONE ENCOUNTER
Future Appointments   Date Time Provider Tom Salcedo   2/27/2023  4:00 PM Mortimer Mallick, MD [facility] [place]     LOV  11/21/2022    Last fill  11/21/2022  90 tabs, 0 refills

## 2023-02-27 ENCOUNTER — OFFICE VISIT (OUTPATIENT)
Dept: RHEUMATOLOGY | Facility: CLINIC | Age: 38
End: 2023-02-27
Payer: MEDICAID

## 2023-02-27 VITALS
RESPIRATION RATE: 16 BRPM | OXYGEN SATURATION: 98 % | TEMPERATURE: 98 F | WEIGHT: 224.38 LBS | DIASTOLIC BLOOD PRESSURE: 82 MMHG | SYSTOLIC BLOOD PRESSURE: 108 MMHG | BODY MASS INDEX: 27.32 KG/M2 | HEART RATE: 72 BPM | HEIGHT: 76 IN

## 2023-02-27 DIAGNOSIS — Z51.81 THERAPEUTIC DRUG MONITORING: ICD-10-CM

## 2023-02-27 DIAGNOSIS — K29.70 GASTRITIS WITHOUT BLEEDING, UNSPECIFIED CHRONICITY, UNSPECIFIED GASTRITIS TYPE: ICD-10-CM

## 2023-02-27 DIAGNOSIS — M34.9 SCLERODERMA (HCC): ICD-10-CM

## 2023-02-27 DIAGNOSIS — R06.09 DOE (DYSPNEA ON EXERTION): ICD-10-CM

## 2023-02-27 DIAGNOSIS — Z79.899 LONG-TERM USE OF HYDROXYCHLOROQUINE: Primary | ICD-10-CM

## 2023-02-27 DIAGNOSIS — G62.9 NEUROPATHY: ICD-10-CM

## 2023-02-27 PROCEDURE — 3074F SYST BP LT 130 MM HG: CPT | Performed by: INTERNAL MEDICINE

## 2023-02-27 PROCEDURE — 3008F BODY MASS INDEX DOCD: CPT | Performed by: INTERNAL MEDICINE

## 2023-02-27 PROCEDURE — 99214 OFFICE O/P EST MOD 30 MIN: CPT | Performed by: INTERNAL MEDICINE

## 2023-02-27 PROCEDURE — 3079F DIAST BP 80-89 MM HG: CPT | Performed by: INTERNAL MEDICINE

## 2023-02-27 RX ORDER — PANTOPRAZOLE SODIUM 40 MG/1
40 TABLET, DELAYED RELEASE ORAL
Qty: 90 TABLET | Refills: 3 | Status: SHIPPED | OUTPATIENT
Start: 2023-02-27

## 2023-02-27 NOTE — PATIENT INSTRUCTIONS
Ophthalmology [facility] doctor)  -Dr. Veronica Yadav  [facility], [address]  Tel: [phone]  Fax: [phone]    [facility]  Dr. Bay Bedoya Number: [phone]    [facility] eye clinic.  Please call to schedule: phone: [phone] or [phone] (in [facility]).     ==============================================================================================================    Saee if [facility] has cheaper Omeprazole or pantoprazole

## 2023-04-24 DIAGNOSIS — I10 PRIMARY HYPERTENSION: ICD-10-CM

## 2023-04-24 DIAGNOSIS — K29.70 GASTRITIS WITHOUT BLEEDING, UNSPECIFIED CHRONICITY, UNSPECIFIED GASTRITIS TYPE: ICD-10-CM

## 2023-04-24 DIAGNOSIS — M34.9 SCLERODERMA (HCC): ICD-10-CM

## 2023-04-24 RX ORDER — LOSARTAN POTASSIUM 100 MG/1
TABLET ORAL
Qty: 90 TABLET | Refills: 1 | Status: SHIPPED | OUTPATIENT
Start: 2023-04-24

## 2023-04-24 NOTE — TELEPHONE ENCOUNTER
Future Appointments   Date Time Provider Tom Denisse   6/26/2023 11:30 AM Francisca Henderson MD [facility] [place]     LOV  2/27/2023    Last refill  11/14/2022  90 tabs, 1 refill

## 2023-05-17 ENCOUNTER — TELEPHONE (OUTPATIENT)
Dept: RHEUMATOLOGY | Facility: CLINIC | Age: 38
End: 2023-05-17

## 2023-05-17 DIAGNOSIS — K29.70 GASTRITIS WITHOUT BLEEDING, UNSPECIFIED CHRONICITY, UNSPECIFIED GASTRITIS TYPE: ICD-10-CM

## 2023-05-17 DIAGNOSIS — M06.00 SERONEGATIVE RHEUMATOID ARTHRITIS (HCC): ICD-10-CM

## 2023-05-17 DIAGNOSIS — M34.9 SCLERODERMA (HCC): ICD-10-CM

## 2023-05-17 LAB
ABSOLUTE BASOPHILS: 59 CELLS/UL (ref 0–200)
ABSOLUTE EOSINOPHILS: 73 CELLS/UL (ref 15–500)
ABSOLUTE LYMPHOCYTES: 1630 CELLS/UL (ref 850–3900)
ABSOLUTE MONOCYTES: 931 CELLS/UL (ref 200–950)
ABSOLUTE NEUTROPHILS: 3907 CELLS/UL (ref 1500–7800)
ALBUMIN/GLOBULIN RATIO: 1.5 (CALC) (ref 1–2.5)
ALBUMIN: 4.5 G/DL (ref 3.6–5.1)
ALKALINE PHOSPHATASE: 72 U/L (ref 36–130)
ALT: 27 U/L (ref 9–46)
APPEARANCE: CLEAR
AST: 34 U/L (ref 10–40)
BASOPHILS: 0.9 %
BILIRUBIN, TOTAL: 0.5 MG/DL (ref 0.2–1.2)
BILIRUBIN: NEGATIVE
BUN: 13 MG/DL (ref 7–25)
CALCIUM: 10.2 MG/DL (ref 8.6–10.3)
CARBON DIOXIDE: 26 MMOL/L (ref 20–32)
CHLORIDE: 104 MMOL/L (ref 98–110)
COLOR: YELLOW
CREATININE: 1.09 MG/DL (ref 0.6–1.26)
EGFR: 89 ML/MIN/1.73M2
EOSINOPHILS: 1.1 %
GLOBULIN: 3.1 G/DL (CALC) (ref 1.9–3.7)
GLUCOSE: 100 MG/DL (ref 65–139)
GLUCOSE: NEGATIVE
HEMATOCRIT: 42.4 % (ref 38.5–50)
HEMOGLOBIN: 14.5 G/DL (ref 13.2–17.1)
LEUKOCYTE ESTERASE: NEGATIVE
LYMPHOCYTES: 24.7 %
MCH: 30.7 PG (ref 27–33)
MCHC: 34.2 G/DL (ref 32–36)
MCV: 89.8 FL (ref 80–100)
MONOCYTES: 14.1 %
MPV: 9.1 FL (ref 7.5–12.5)
NEUTROPHILS: 59.2 %
NITRITE: NEGATIVE
OCCULT BLOOD: NEGATIVE
PLATELET COUNT: 311 THOUSAND/UL (ref 140–400)
POTASSIUM: 3.7 MMOL/L (ref 3.5–5.3)
PROTEIN, TOTAL: 7.6 G/DL (ref 6.1–8.1)
RDW: 13.7 % (ref 11–15)
RED BLOOD CELL COUNT: 4.72 MILLION/UL (ref 4.2–5.8)
SODIUM: 141 MMOL/L (ref 135–146)
SPECIFIC GRAVITY: 1.02 (ref 1–1.03)
WHITE BLOOD CELL COUNT: 6.6 THOUSAND/UL (ref 3.8–10.8)

## 2023-05-17 RX ORDER — LEFLUNOMIDE 20 MG/1
20 TABLET ORAL DAILY
Qty: 90 TABLET | Refills: 0 | Status: SHIPPED | OUTPATIENT
Start: 2023-05-17

## 2023-05-17 RX ORDER — MYCOPHENOLIC ACID 360 MG/1
1080 TABLET, DELAYED RELEASE ORAL
Qty: 540 TABLET | Refills: 0 | Status: SHIPPED | OUTPATIENT
Start: 2023-05-17 | End: 2023-08-15

## 2023-06-29 ENCOUNTER — TELEPHONE (OUTPATIENT)
Dept: RHEUMATOLOGY | Facility: CLINIC | Age: 38
End: 2023-06-29

## 2023-08-02 ENCOUNTER — HOSPITAL ENCOUNTER (OUTPATIENT)
Dept: CV DIAGNOSTICS | Facility: HOSPITAL | Age: 38
Discharge: HOME | End: 2023-08-02
Attending: INTERNAL MEDICINE
Payer: MEDICAID

## 2023-08-02 ENCOUNTER — RT VISIT (OUTPATIENT)
Dept: RESPIRATORY THERAPY | Facility: HOSPITAL | Age: 38
End: 2023-08-02
Attending: INTERNAL MEDICINE
Payer: MEDICAID

## 2023-08-02 DIAGNOSIS — Z51.81 THERAPEUTIC DRUG MONITORING: ICD-10-CM

## 2023-08-02 DIAGNOSIS — R06.09 DOE (DYSPNEA ON EXERTION): ICD-10-CM

## 2023-08-02 DIAGNOSIS — M34.9 SCLERODERMA (HCC): ICD-10-CM

## 2023-08-02 PROCEDURE — 94729 DIFFUSING CAPACITY: CPT | Performed by: INTERNAL MEDICINE

## 2023-08-02 PROCEDURE — 94010 BREATHING CAPACITY TEST: CPT | Performed by: INTERNAL MEDICINE

## 2023-08-02 PROCEDURE — 93306 TTE W/DOPPLER COMPLETE: CPT | Performed by: INTERNAL MEDICINE

## 2023-08-02 PROCEDURE — 94726 PLETHYSMOGRAPHY LUNG VOLUMES: CPT | Performed by: INTERNAL MEDICINE

## 2023-08-03 PROBLEM — R06.09 DOE (DYSPNEA ON EXERTION): Status: ACTIVE | Noted: 2023-08-03

## 2023-08-03 NOTE — PROCEDURES
Findings:  FEV1 is 3.48L, 80% predicted. FVC is 4.12L, 76% predicted. FEV1/ FVC ratio is 0.85. The flow-volume loop demonstrates a normal pattern. The TLC is 6.11L, 73% predicted. The residual volume 1.98L, 93% predicted. The diffusion capacity is 63% predicted and 102% predicted when corrected for alveolar volume. Impression:  There is no airway obstruction on spirometry and visualized on flow-volume loop. There is mild restriction with total lung capacity of 6.11L, 73% predicted. This can be seen in heart failure, fluid overload states, interstitial lung disease, thoracic spine/chest disorders, obesity as well as other clinical scenarios. Further clinical correlation required. Diffusion capacity is mildly reduced with DLCO of 63% but improves to normal when considering alveolar volume. This may represent a normal finding but can be seen in emphysema, interstitial lung disease, pulmonary vascular disease (such as pulmonary hypertension) and anemia. If not already performed, would suggest further evaluation as determined clinically. There are no previous pulmonary function tests available for comparison.

## 2023-08-15 DIAGNOSIS — K29.70 GASTRITIS WITHOUT BLEEDING, UNSPECIFIED CHRONICITY, UNSPECIFIED GASTRITIS TYPE: ICD-10-CM

## 2023-08-15 DIAGNOSIS — M34.9 SCLERODERMA (HCC): ICD-10-CM

## 2023-08-15 DIAGNOSIS — M06.00 SERONEGATIVE RHEUMATOID ARTHRITIS (HCC): ICD-10-CM

## 2023-08-15 RX ORDER — MYCOPHENOLIC ACID 360 MG/1
1080 TABLET, DELAYED RELEASE ORAL
Qty: 540 TABLET | Refills: 0 | Status: SHIPPED | OUTPATIENT
Start: 2023-08-15

## 2023-08-15 NOTE — TELEPHONE ENCOUNTER
Future Appointments   Date Time Provider Tom Salcedo   8/28/2023 11:00 AM Carolina Quiros MD [facility]     Last office visit: 2/27/2023    Last fill: 5/17/2023 540 tab, 0 refills    Last labs: 5/17/2023

## 2023-08-26 DIAGNOSIS — K21.9 GASTROESOPHAGEAL REFLUX DISEASE, UNSPECIFIED WHETHER ESOPHAGITIS PRESENT: ICD-10-CM

## 2023-08-26 DIAGNOSIS — M34.9 SCLERODERMA (HCC): ICD-10-CM

## 2023-08-26 DIAGNOSIS — K29.70 GASTRITIS WITHOUT BLEEDING, UNSPECIFIED CHRONICITY, UNSPECIFIED GASTRITIS TYPE: ICD-10-CM

## 2023-08-28 ENCOUNTER — OFFICE VISIT (OUTPATIENT)
Dept: RHEUMATOLOGY | Facility: CLINIC | Age: 38
End: 2023-08-28
Payer: MEDICAID

## 2023-08-28 VITALS
SYSTOLIC BLOOD PRESSURE: 148 MMHG | HEART RATE: 78 BPM | BODY MASS INDEX: 27.25 KG/M2 | HEIGHT: 76 IN | DIASTOLIC BLOOD PRESSURE: 94 MMHG | TEMPERATURE: 98 F | WEIGHT: 223.81 LBS | OXYGEN SATURATION: 96 %

## 2023-08-28 DIAGNOSIS — K21.9 GASTROESOPHAGEAL REFLUX DISEASE, UNSPECIFIED WHETHER ESOPHAGITIS PRESENT: ICD-10-CM

## 2023-08-28 DIAGNOSIS — I10 PRIMARY HYPERTENSION: ICD-10-CM

## 2023-08-28 DIAGNOSIS — M34.9 SCLERODERMA (HCC): Primary | ICD-10-CM

## 2023-08-28 DIAGNOSIS — L98.491 SKIN ULCER OF FINGER, LIMITED TO BREAKDOWN OF SKIN (HCC): ICD-10-CM

## 2023-08-28 DIAGNOSIS — M06.00 SERONEGATIVE RHEUMATOID ARTHRITIS (HCC): ICD-10-CM

## 2023-08-28 DIAGNOSIS — K29.70 GASTRITIS WITHOUT BLEEDING, UNSPECIFIED CHRONICITY, UNSPECIFIED GASTRITIS TYPE: ICD-10-CM

## 2023-08-28 DIAGNOSIS — I73.00 RAYNAUD'S DISEASE WITHOUT GANGRENE: ICD-10-CM

## 2023-08-28 LAB
BILIRUB UR QL STRIP.AUTO: NEGATIVE
CREAT UR-SCNC: 294 MG/DL
GLUCOSE UR STRIP.AUTO-MCNC: NORMAL MG/DL
HYALINE CASTS #/AREA URNS AUTO: PRESENT /LPF
KETONES UR STRIP.AUTO-MCNC: NEGATIVE MG/DL
LEUKOCYTE ESTERASE UR QL STRIP.AUTO: NEGATIVE
NITRITE UR QL STRIP.AUTO: NEGATIVE
PH UR STRIP.AUTO: 5.5 [PH] (ref 5–8)
PROT UR STRIP.AUTO-MCNC: 30 MG/DL
PROT UR-MCNC: 63.6 MG/DL
RBC UR QL AUTO: NEGATIVE
SP GR UR STRIP.AUTO: 1.02 (ref 1–1.03)
UROBILINOGEN UR STRIP.AUTO-MCNC: NORMAL MG/DL

## 2023-08-28 PROCEDURE — 82570 ASSAY OF URINE CREATININE: CPT | Performed by: INTERNAL MEDICINE

## 2023-08-28 PROCEDURE — 81001 URINALYSIS AUTO W/SCOPE: CPT | Performed by: INTERNAL MEDICINE

## 2023-08-28 PROCEDURE — 84156 ASSAY OF PROTEIN URINE: CPT | Performed by: INTERNAL MEDICINE

## 2023-08-28 PROCEDURE — 3080F DIAST BP >= 90 MM HG: CPT | Performed by: INTERNAL MEDICINE

## 2023-08-28 PROCEDURE — 99215 OFFICE O/P EST HI 40 MIN: CPT | Performed by: INTERNAL MEDICINE

## 2023-08-28 PROCEDURE — 3077F SYST BP >= 140 MM HG: CPT | Performed by: INTERNAL MEDICINE

## 2023-08-28 PROCEDURE — 3008F BODY MASS INDEX DOCD: CPT | Performed by: INTERNAL MEDICINE

## 2023-08-28 RX ORDER — PANTOPRAZOLE SODIUM 40 MG/1
40 TABLET, DELAYED RELEASE ORAL
Qty: 90 TABLET | Refills: 1 | Status: SHIPPED | OUTPATIENT
Start: 2023-08-28

## 2023-08-28 RX ORDER — AMLODIPINE BESYLATE 10 MG/1
10 TABLET ORAL DAILY
Qty: 90 TABLET | Refills: 3 | Status: SHIPPED | OUTPATIENT
Start: 2023-08-28 | End: 2024-08-22

## 2023-08-28 RX ORDER — PANTOPRAZOLE SODIUM 40 MG/1
TABLET, DELAYED RELEASE ORAL
Qty: 90 TABLET | Refills: 1 | Status: SHIPPED | OUTPATIENT
Start: 2023-08-28 | End: 2023-08-28

## 2023-08-28 RX ORDER — LEFLUNOMIDE 20 MG/1
20 TABLET ORAL DAILY
Qty: 90 TABLET | Refills: 0 | Status: SHIPPED | OUTPATIENT
Start: 2023-08-28

## 2023-08-28 RX ORDER — HYDROXYCHLOROQUINE SULFATE 200 MG/1
TABLET, FILM COATED ORAL
Qty: 180 TABLET | Refills: 1 | Status: SHIPPED | OUTPATIENT
Start: 2023-08-28

## 2023-08-28 RX ORDER — LOSARTAN POTASSIUM 100 MG/1
100 TABLET ORAL DAILY
Qty: 90 TABLET | Refills: 1 | Status: SHIPPED | OUTPATIENT
Start: 2023-08-28

## 2023-08-28 NOTE — TELEPHONE ENCOUNTER
Future Appointments   Date Time Provider Tom Denisse   8/28/2023 11:00 AM Faizan Carranza MD [facility] [place] [facility]     Last office visit: 2/27/2023    Last fill: 2/27/2023 90 tab, 3 refills

## 2023-08-28 NOTE — PATIENT INSTRUCTIONS
-get blood work now.   -restart amlodipine 10 mg daily for Raynauds and digital ulcers  -continue leflunomide 20 mg daily  -cont hydroxychloroquine (plaquenil) 200 mg twice daily for inflammatory arthritis  -continue myfortic (mycophenolic sodium) 4505 mg (3 pills) in the morning and  1080 mg (3 pills) in the evening.    -For Raynaud's: Continue Amlodipine 10 mg daily and Losartan 100 mg daily

## 2023-08-29 ENCOUNTER — TELEPHONE (OUTPATIENT)
Dept: RHEUMATOLOGY | Facility: CLINIC | Age: 38
End: 2023-08-29

## 2023-08-29 NOTE — TELEPHONE ENCOUNTER
PA start    Prior authorization for:     Medication form: pantoproazole 40mg daily    Submission method: covermymeds    Date submitted: 8/29/23    Tracking #: [id number]    QF-TB result: n/a

## 2023-08-30 ENCOUNTER — TELEPHONE (OUTPATIENT)
Dept: RHEUMATOLOGY | Facility: CLINIC | Age: 38
End: 2023-08-30

## 2023-08-31 LAB
ABSOLUTE BASOPHILS: 48 CELLS/UL (ref 0–200)
ABSOLUTE EOSINOPHILS: 88 CELLS/UL (ref 15–500)
ABSOLUTE LYMPHOCYTES: 2352 CELLS/UL (ref 850–3900)
ABSOLUTE MONOCYTES: 896 CELLS/UL (ref 200–950)
ABSOLUTE NEUTROPHILS: 4616 CELLS/UL (ref 1500–7800)
ALBUMIN/GLOBULIN RATIO: 1.6 (CALC) (ref 1–2.5)
ALBUMIN: 4.5 G/DL (ref 3.6–5.1)
ALKALINE PHOSPHATASE: 71 U/L (ref 36–130)
ALT: 12 U/L (ref 9–46)
AST: 20 U/L (ref 10–40)
BASOPHILS: 0.6 %
BILIRUBIN, TOTAL: 0.3 MG/DL (ref 0.2–1.2)
BUN: 13 MG/DL (ref 7–25)
CALCIUM: 10 MG/DL (ref 8.6–10.3)
CARBON DIOXIDE: 24 MMOL/L (ref 20–32)
CHLORIDE: 107 MMOL/L (ref 98–110)
CREATININE: 1.14 MG/DL (ref 0.6–1.26)
EGFR: 84 ML/MIN/1.73M2
EOSINOPHILS: 1.1 %
GLOBULIN: 2.8 G/DL (CALC) (ref 1.9–3.7)
GLUCOSE: 90 MG/DL (ref 65–99)
HEMATOCRIT: 43.7 % (ref 38.5–50)
HEMOGLOBIN: 14.3 G/DL (ref 13.2–17.1)
LYMPHOCYTES: 29.4 %
MCH: 30.3 PG (ref 27–33)
MCHC: 32.7 G/DL (ref 32–36)
MCV: 92.6 FL (ref 80–100)
MONOCYTES: 11.2 %
MPV: 9.5 FL (ref 7.5–12.5)
NEUTROPHILS: 57.7 %
PLATELET COUNT: 367 THOUSAND/UL (ref 140–400)
POTASSIUM: 3.6 MMOL/L (ref 3.5–5.3)
PROTEIN, TOTAL: 7.3 G/DL (ref 6.1–8.1)
RDW: 13.5 % (ref 11–15)
RED BLOOD CELL COUNT: 4.72 MILLION/UL (ref 4.2–5.8)
SODIUM: 143 MMOL/L (ref 135–146)
WHITE BLOOD CELL COUNT: 8 THOUSAND/UL (ref 3.8–10.8)

## 2023-09-01 NOTE — TELEPHONE ENCOUNTER
Called [facility]:  [phone]  Refaxed with doctored note today. Marylen Lusher, MD5 hours ago (8:18 AM)        Please message pharmacy plan back     Patient with diffuse scleroderma and esophageal dysmotility resulting in GERD. Chronic PPI therapy is mandated to prevent aspiration/chemical pneumonitis. Will update last progress note. Zulema Jasso, RNYesterday (11:41 AM)     Denied for quantity limit. They require documentation with support as to why he needs to take this medication longer than they duration limit set by the pharmacy plan.           Note

## 2023-09-11 NOTE — TELEPHONE ENCOUNTER
Checked on status of appeal/additional information. 9/1 approved: pantoprazole up to 60 tabs a month.  Faxing approval.     PA Approved    Prior authorization for: pantoprazole    Medication form: 40mg tab    Date received:     Approval #: [id number]    Approved dates: 6/3/2023- 12/30/2023    Pharmacy for medication: [facility]    QF-TB results:

## 2023-11-13 DIAGNOSIS — K29.70 GASTRITIS WITHOUT BLEEDING, UNSPECIFIED CHRONICITY, UNSPECIFIED GASTRITIS TYPE: ICD-10-CM

## 2023-11-13 DIAGNOSIS — M34.9 SCLERODERMA (HCC): ICD-10-CM

## 2023-11-13 DIAGNOSIS — M06.00 SERONEGATIVE RHEUMATOID ARTHRITIS (HCC): ICD-10-CM

## 2023-11-13 RX ORDER — MYCOPHENOLIC ACID 360 MG/1
TABLET, DELAYED RELEASE ORAL
Qty: 540 TABLET | Refills: 0 | Status: SHIPPED | OUTPATIENT
Start: 2023-11-13

## 2023-11-13 NOTE — TELEPHONE ENCOUNTER
Last office visit: 8/28/2023    Next Rheum Apt:11/28/2023 Donna De La Torre MD    Last fill: 8/15/2023   540 tabs, 0 refills     Labs:   Lab Results   Component Value Date    CREATSERUM 1.15 09/07/2023    ALKPHO 71 08/30/2023    AST 20 08/30/2023    ALT 12 08/30/2023    BILT 0.3 08/30/2023    TP 7.3 08/30/2023    ALB 4.5 08/30/2023       Lab Results   Component Value Date    WBC 8.0 08/30/2023    HGB 14.3 08/30/2023     08/30/2023    NEPERCENT 57.7 08/30/2023    LYPERCENT 29.4 08/30/2023    NE 4,616 08/30/2023    LYMABS 2,352 08/30/2023

## 2023-11-28 ENCOUNTER — OFFICE VISIT (OUTPATIENT)
Dept: RHEUMATOLOGY | Facility: CLINIC | Age: 38
End: 2023-11-28
Payer: MEDICAID

## 2023-11-28 VITALS
BODY MASS INDEX: 26.84 KG/M2 | HEIGHT: 76 IN | DIASTOLIC BLOOD PRESSURE: 82 MMHG | RESPIRATION RATE: 16 BRPM | SYSTOLIC BLOOD PRESSURE: 110 MMHG | OXYGEN SATURATION: 98 % | TEMPERATURE: 97 F | HEART RATE: 89 BPM | WEIGHT: 220.38 LBS

## 2023-11-28 DIAGNOSIS — K29.70 GASTRITIS WITHOUT BLEEDING, UNSPECIFIED CHRONICITY, UNSPECIFIED GASTRITIS TYPE: ICD-10-CM

## 2023-11-28 DIAGNOSIS — I73.00 RAYNAUD'S DISEASE WITHOUT GANGRENE: ICD-10-CM

## 2023-11-28 DIAGNOSIS — M34.9 SCLERODERMA (HCC): ICD-10-CM

## 2023-11-28 DIAGNOSIS — I10 PRIMARY HYPERTENSION: ICD-10-CM

## 2023-11-28 DIAGNOSIS — M06.00 SERONEGATIVE RHEUMATOID ARTHRITIS (HCC): ICD-10-CM

## 2023-11-28 DIAGNOSIS — L94.3 SCLERODACTYLY: Primary | ICD-10-CM

## 2023-11-28 PROCEDURE — 3074F SYST BP LT 130 MM HG: CPT | Performed by: INTERNAL MEDICINE

## 2023-11-28 PROCEDURE — 99215 OFFICE O/P EST HI 40 MIN: CPT | Performed by: INTERNAL MEDICINE

## 2023-11-28 PROCEDURE — 3008F BODY MASS INDEX DOCD: CPT | Performed by: INTERNAL MEDICINE

## 2023-11-28 PROCEDURE — 3079F DIAST BP 80-89 MM HG: CPT | Performed by: INTERNAL MEDICINE

## 2023-11-28 RX ORDER — HYDROXYCHLOROQUINE SULFATE 200 MG/1
TABLET, FILM COATED ORAL
Qty: 180 TABLET | Refills: 1 | Status: SHIPPED | OUTPATIENT
Start: 2023-11-28

## 2023-11-28 RX ORDER — AMLODIPINE BESYLATE 10 MG/1
10 TABLET ORAL DAILY
Qty: 90 TABLET | Refills: 3 | Status: SHIPPED | OUTPATIENT
Start: 2023-11-28 | End: 2024-11-22

## 2023-11-28 RX ORDER — KETOCONAZOLE 20 MG/G
CREAM TOPICAL
Qty: 30 G | Refills: 2 | Status: SHIPPED | OUTPATIENT
Start: 2023-11-28

## 2023-11-28 NOTE — PATIENT INSTRUCTIONS
Ketoconazole Use twice daily x 14 days now and in April 2024    -get blood work now.   For monitoring of leflunomide and Myfortic.  -continue amlodipine 10 mg daily for Raynauds and digital ulcers  -apply vaseline overlying ulcers.  -Continue losartan 100 mg for Raynaud's  -continue leflunomide 20 mg daily  -cont hydroxychloroquine (plaquenil) 200 mg twice daily for inflammatory arthritis  -continue myfortic (mycophenolic sodium) 3868 mg (3 pills) in the morning and  1080 mg (3 pills) in the evening.    -continue pantoprazole for Ssc GERD;

## 2023-11-30 ENCOUNTER — TELEPHONE (OUTPATIENT)
Dept: RHEUMATOLOGY | Facility: CLINIC | Age: 38
End: 2023-11-30

## 2023-11-30 DIAGNOSIS — M34.9 SCLERODERMA (HCC): ICD-10-CM

## 2023-11-30 DIAGNOSIS — R82.993 HIGH URINE URIC ACID LEVEL: Primary | ICD-10-CM

## 2023-11-30 DIAGNOSIS — M25.50 ARTHRALGIA, UNSPECIFIED JOINT: ICD-10-CM

## 2023-11-30 LAB
ABSOLUTE BASOPHILS: 62 CELLS/UL (ref 0–200)
ABSOLUTE EOSINOPHILS: 47 CELLS/UL (ref 15–500)
ABSOLUTE LYMPHOCYTES: 1856 CELLS/UL (ref 850–3900)
ABSOLUTE MONOCYTES: 913 CELLS/UL (ref 200–950)
ABSOLUTE NEUTROPHILS: 4922 CELLS/UL (ref 1500–7800)
ALBUMIN/GLOBULIN RATIO: 1.6 (CALC) (ref 1–2.5)
ALBUMIN: 4.4 G/DL (ref 3.6–5.1)
ALKALINE PHOSPHATASE: 59 U/L (ref 36–130)
ALT: 12 U/L (ref 9–46)
AST: 16 U/L (ref 10–40)
BASOPHILS: 0.8 %
BILIRUBIN, TOTAL: 0.4 MG/DL (ref 0.2–1.2)
BILIRUBIN: NEGATIVE
BUN: 11 MG/DL (ref 7–25)
CALCIUM: 9.7 MG/DL (ref 8.6–10.3)
CARBON DIOXIDE: 26 MMOL/L (ref 20–32)
CHLORIDE: 107 MMOL/L (ref 98–110)
COLOR: YELLOW
CREATINE KINASE, TOTAL: 284 U/L (ref 44–196)
CREATININE: 0.98 MG/DL (ref 0.6–1.26)
EGFR: 101 ML/MIN/1.73M2
EOSINOPHILS: 0.6 %
GLOBULIN: 2.7 G/DL (CALC) (ref 1.9–3.7)
GLUCOSE: 95 MG/DL (ref 65–139)
GLUCOSE: NEGATIVE
HEMATOCRIT: 42.5 % (ref 38.5–50)
HEMOGLOBIN: 14.3 G/DL (ref 13.2–17.1)
KETONES: NEGATIVE
LEUKOCYTE ESTERASE: NEGATIVE
LYMPHOCYTES: 23.8 %
MCH: 30.3 PG (ref 27–33)
MCHC: 33.6 G/DL (ref 32–36)
MCV: 90 FL (ref 80–100)
MONOCYTES: 11.7 %
MPV: 9.3 FL (ref 7.5–12.5)
NEUTROPHILS: 63.1 %
NITRITE: NEGATIVE
OCCULT BLOOD: NEGATIVE
PLATELET COUNT: 289 THOUSAND/UL (ref 140–400)
POTASSIUM: 3.9 MMOL/L (ref 3.5–5.3)
PROTEIN, TOTAL: 7.1 G/DL (ref 6.1–8.1)
RDW: 13.3 % (ref 11–15)
RED BLOOD CELL COUNT: 4.72 MILLION/UL (ref 4.2–5.8)
SODIUM: 141 MMOL/L (ref 135–146)
SPECIFIC GRAVITY: 1.02 (ref 1–1.03)
WHITE BLOOD CELL COUNT: 7.8 THOUSAND/UL (ref 3.8–10.8)

## 2023-12-03 DIAGNOSIS — M34.9 SCLERODERMA (HCC): ICD-10-CM

## 2023-12-03 DIAGNOSIS — K29.70 GASTRITIS WITHOUT BLEEDING, UNSPECIFIED CHRONICITY, UNSPECIFIED GASTRITIS TYPE: ICD-10-CM

## 2023-12-03 DIAGNOSIS — M06.00 SERONEGATIVE RHEUMATOID ARTHRITIS (HCC): ICD-10-CM

## 2023-12-04 RX ORDER — LEFLUNOMIDE 20 MG/1
20 TABLET ORAL DAILY
Qty: 90 TABLET | Refills: 0 | Status: SHIPPED | OUTPATIENT
Start: 2023-12-04

## 2023-12-04 NOTE — TELEPHONE ENCOUNTER
LOV:    11/28/2023      Future Appointments   Date Time Provider Tom Salcedo   3/1/2024  1:00 PM Viktoria Prakash MD [facility] [place]       LF:    08/28/2023     QTY:    90        Refills:     0    LABS:        Component      Latest Ref Rng 11/29/2023   WBC      3.8 - 10.8 Thousand/uL 7.8    RBC      4.20 - 5.80 Million/uL 4.72    Hemoglobin      13.2 - 17.1 g/dL 14.3    Hematocrit      38.5 - 50.0 % 42.5    MCV      80.0 - 100.0 fL 90.0    MCH      27.0 - 33.0 pg 30.3    MCHC      32.0 - 36.0 g/dL 33.6    RDW      11.0 - 15.0 % 13.3    Platelet Count      100 - 400 Thousand/uL 289    MPV      7.5 - 12.5 fL 9.3    Neutrophils Absolute      1,500 - 7,800 cells/uL 4,922    Lymphocytes Absolute      850 - 3,900 cells/uL 1,856    Monocytes Absolute      200 - 950 cells/uL 913    Eosinophils Absolute      15 - 500 cells/uL 47    Basophils Absolute      0 - 200 cells/uL 62    Neutrophils %      % 63.1    Lymphocytes %      % 23.8    Monocytes %      % 11.7    Eosinophils %      % 0.6    Basophils %      % 0.8    Glucose      65 - 139 mg/dL 95    BUN      7 - 25 mg/dL 11    CREATININE      0.60 - 1.26 mg/dL 0.98    EGFR      > OR = 60 mL/min/1.73m2 101    BUN/CREATININE RATIO      6 - 22 (calc) SEE NOTE:    Sodium      135 - 146 mmol/L 141    Potassium      3.5 - 5.3 mmol/L 3.9    Chloride      98 - 110 mmol/L 107    Carbon Dioxide, Total      20 - 32 mmol/L 26    CALCIUM      8.6 - 10.3 mg/dL 9.7    PROTEIN, TOTAL      6.1 - 8.1 g/dL 7.1    Albumin      3.6 - 5.1 g/dL 4.4    Globulin      1.9 - 3.7 g/dL (calc) 2.7    A/G Ratio      1.0 - 2.5 (calc) 1.6    Total Bilirubin      0.2 - 1.2 mg/dL 0.4    Alkaline Phosphatase      36 - 130 U/L 59    AST (SGOT)      10 - 40 U/L 16    ALT (SGPT)      9 - 46 U/L 12

## 2024-02-13 DIAGNOSIS — K29.70 GASTRITIS WITHOUT BLEEDING, UNSPECIFIED CHRONICITY, UNSPECIFIED GASTRITIS TYPE: ICD-10-CM

## 2024-02-13 DIAGNOSIS — M06.00 SERONEGATIVE RHEUMATOID ARTHRITIS (HCC): ICD-10-CM

## 2024-02-13 DIAGNOSIS — M34.9 SCLERODERMA (HCC): ICD-10-CM

## 2024-02-13 RX ORDER — MYCOPHENOLIC ACID 360 MG/1
TABLET, DELAYED RELEASE ORAL
Qty: 540 TABLET | Refills: 0 | Status: SHIPPED | OUTPATIENT
Start: 2024-02-13

## 2024-02-13 NOTE — TELEPHONE ENCOUNTER
Last office visit: 11/28/2023    Next Rheum Apt:3/1/2024 Judd Douglas MD    Last fill: 11/13/2023 540 tab, 0 refills    Labs:   Lab Results   Component Value Date    CREATSERUM 0.98 11/29/2023    ALKPHO 59 11/29/2023    AST 16 11/29/2023    ALT 12 11/29/2023    BILT 0.4 11/29/2023    TP 7.1 11/29/2023    ALB 4.4 11/29/2023       Lab Results   Component Value Date    WBC 7.8 11/29/2023    HGB 14.3 11/29/2023     11/29/2023    NEPERCENT 63.1 11/29/2023    LYPERCENT 23.8 11/29/2023    NE 4,922 11/29/2023    LYMABS 1,856 11/29/2023

## 2024-02-21 DIAGNOSIS — M06.00 SERONEGATIVE RHEUMATOID ARTHRITIS (HCC): ICD-10-CM

## 2024-02-21 DIAGNOSIS — M34.9 SCLERODERMA (HCC): ICD-10-CM

## 2024-02-21 DIAGNOSIS — K21.9 GASTROESOPHAGEAL REFLUX DISEASE, UNSPECIFIED WHETHER ESOPHAGITIS PRESENT: ICD-10-CM

## 2024-02-21 DIAGNOSIS — K29.70 GASTRITIS WITHOUT BLEEDING, UNSPECIFIED CHRONICITY, UNSPECIFIED GASTRITIS TYPE: ICD-10-CM

## 2024-02-22 ENCOUNTER — TELEPHONE (OUTPATIENT)
Dept: RHEUMATOLOGY | Facility: CLINIC | Age: 39
End: 2024-02-22

## 2024-02-22 RX ORDER — PANTOPRAZOLE SODIUM 40 MG/1
40 TABLET, DELAYED RELEASE ORAL
Qty: 90 TABLET | Refills: 1 | Status: SHIPPED | OUTPATIENT
Start: 2024-02-22

## 2024-02-22 NOTE — TELEPHONE ENCOUNTER
Future Appointments   Date Time Provider Department Center   3/1/2024  1:00 PM Judd Douglas MD [facility] [place]     Last office visit: 11/28/2023    Last fill: 8/28/2023 90 tab, 1 refill

## 2024-02-27 DIAGNOSIS — M06.00 SERONEGATIVE RHEUMATOID ARTHRITIS (HCC): ICD-10-CM

## 2024-02-27 DIAGNOSIS — K29.70 GASTRITIS WITHOUT BLEEDING, UNSPECIFIED CHRONICITY, UNSPECIFIED GASTRITIS TYPE: ICD-10-CM

## 2024-02-27 DIAGNOSIS — M34.9 SCLERODERMA (HCC): ICD-10-CM

## 2024-02-27 RX ORDER — LEFLUNOMIDE 20 MG/1
20 TABLET ORAL DAILY
Qty: 90 TABLET | Refills: 0 | Status: SHIPPED | OUTPATIENT
Start: 2024-02-27

## 2024-02-27 NOTE — TELEPHONE ENCOUNTER
Last office visit: 11/28/2023    Next Rheum Apt:3/1/2024 Judd Douglas MD    Last fill: 12/4/2023  90 tabs, 0 refills     Labs:   Lab Results   Component Value Date    CREATSERUM 0.98 11/29/2023    ALKPHO 59 11/29/2023    AST 16 11/29/2023    ALT 12 11/29/2023    BILT 0.4 11/29/2023    TP 7.1 11/29/2023    ALB 4.4 11/29/2023       Lab Results   Component Value Date    WBC 7.8 11/29/2023    HGB 14.3 11/29/2023     11/29/2023    NEPERCENT 63.1 11/29/2023    LYPERCENT 23.8 11/29/2023    NE 4,922 11/29/2023    LYMABS 1,856 11/29/2023

## 2024-03-01 ENCOUNTER — OFFICE VISIT (OUTPATIENT)
Dept: RHEUMATOLOGY | Facility: CLINIC | Age: 39
End: 2024-03-01
Payer: MEDICAID

## 2024-03-01 VITALS
DIASTOLIC BLOOD PRESSURE: 76 MMHG | WEIGHT: 222.81 LBS | HEIGHT: 76 IN | TEMPERATURE: 98 F | RESPIRATION RATE: 16 BRPM | SYSTOLIC BLOOD PRESSURE: 102 MMHG | HEART RATE: 92 BPM | OXYGEN SATURATION: 97 % | BODY MASS INDEX: 27.13 KG/M2

## 2024-03-01 DIAGNOSIS — M94.0 COSTOCHONDRITIS: ICD-10-CM

## 2024-03-01 DIAGNOSIS — K21.9 GASTROESOPHAGEAL REFLUX DISEASE, UNSPECIFIED WHETHER ESOPHAGITIS PRESENT: ICD-10-CM

## 2024-03-01 DIAGNOSIS — Z51.81 THERAPEUTIC DRUG MONITORING: ICD-10-CM

## 2024-03-01 DIAGNOSIS — M06.00 SERONEGATIVE RHEUMATOID ARTHRITIS (HCC): ICD-10-CM

## 2024-03-01 DIAGNOSIS — K29.70 GASTRITIS WITHOUT BLEEDING, UNSPECIFIED CHRONICITY, UNSPECIFIED GASTRITIS TYPE: ICD-10-CM

## 2024-03-01 DIAGNOSIS — L98.491 SKIN ULCER OF FINGER, LIMITED TO BREAKDOWN OF SKIN (HCC): ICD-10-CM

## 2024-03-01 DIAGNOSIS — I73.00 RAYNAUD'S DISEASE WITHOUT GANGRENE: ICD-10-CM

## 2024-03-01 DIAGNOSIS — M34.9 SCLERODERMA (HCC): Primary | ICD-10-CM

## 2024-03-01 PROCEDURE — 99215 OFFICE O/P EST HI 40 MIN: CPT | Performed by: INTERNAL MEDICINE

## 2024-03-01 RX ORDER — SILDENAFIL CITRATE 20 MG/1
20 TABLET ORAL 3 TIMES DAILY
Qty: 270 TABLET | Refills: 0 | Status: SHIPPED | OUTPATIENT
Start: 2024-03-01 | End: 2024-03-01

## 2024-03-01 RX ORDER — PANTOPRAZOLE SODIUM 40 MG/1
40 TABLET, DELAYED RELEASE ORAL
Qty: 90 TABLET | Refills: 1 | Status: SHIPPED | OUTPATIENT
Start: 2024-03-01

## 2024-03-01 RX ORDER — SILDENAFIL CITRATE 20 MG/1
20 TABLET ORAL 3 TIMES DAILY
Qty: 270 TABLET | Refills: 0 | Status: SHIPPED | OUTPATIENT
Start: 2024-03-01

## 2024-03-01 NOTE — PROGRESS NOTES
Rheumatology f/u Patient Note  ====================================================================================================  Chief complaint: Scleroderma  ?  PCP  JILLIAN BUTCHER II    =====================================================================================================  HPI 2/2022    Stefan Johnson JrArabella is a 38 year old male   Prior Pulmonary (Dr. Johnson)   Prior Rheum (Dr. Amor, [facility])  Patient here to establish care for scleroderma.  Initially was diagnosed with diffuse scleroderma in January 2020 at [facility].  He was found to have Raynaud's (since end 2018), sclerodactyly, diffuse skin thickening, puffy fingers, GERD, and digital ulcers.  Serologies were consistent with a positive OPAL at 1: 320 and RNA polymerase III and he was started on mycophenolate for skin thickening.  In 2021: He had an echocardiogram which showed an EF of 41% and no TR.  He had PFTs that showed a restrictive defect but normal DLCO.  Immunosuppression:  MMF 1500mg PO BID (started 01/2020, increased to 1500mg in 06/2020, ran out 09/2020-03/2021 because insurance lapse). He has restarted CellCept just 3 days ago after seeing his PCP.  Previously worked as a   Still smoking 2 cigarillos each day.. Previously on chantix, this did not help  EtOH: 2 beers/day.  Used to smoke cannabis.  Stopped  CARVAJAL after moderate activity.   Daughters: 17, 14, 6.   Previously a .   Diarrhea on cellcept.   GERD- bad, used to be on protonix  Raynauds- active  Constipation- none  Skin thickening-yes  Oral ulcers- no  Digital ulcers- in the past, last in 2020  Kidney problems-no. Takes BP  Joint pains/swelling-no  Medications:  MMF 1 gm BID  Losartan 100 mg/HCTZ 12.5 mg  ==============================================================================================================  Visit: 11/28/23  More skin hypopigmentation on the arms in Sept 2023.  Started after weed whacking in the first week mowing the lawn.   Significant sun exposure at that time.  Workup unremarkable home prior to the rash developing.  -July/August: had rash on the dorsum of hand, put arnica on the arm. This helped significantly    Medication:   hydroxychloroquine (plaquenil) 200 mg BID .   myfortic 1080 mg BID   Amlodipine 10 mg daily  Losartan 100 mg  leflunomide 20 mg daily  GERD- controlled  Raynauds-much better after restarting amlodipine  Constipation-good  Skin thickening-still with significant dorsal hand thickening and sclerodactyly  Oral ulcers-none  Digital ulcers-improving  Kidney problems-none  Joint pains/swelling-none  ==============================================================================================================  Today's Visit: 03/01/24    Raynaud's still active.  Better on amlodipine 10 mg daily.  Right PIP 5 digital ulcer almost healed.  However left PIP 5 digital ulcer was healing but then worsened recently.  Patient still smoking 1 cigarette a day.  Trying to quit.  Motivated, not in the smoking cessation clinic yet.  Ulcers developing overlying the dorsum of the bilateral toes.  Still with reproducible chest pain.  CT of the chest completed 2 weeks ago was normal.  No ILD noted.    Medication:   hydroxychloroquine (plaquenil) 200 mg BID .   myfortic 1080 mg BID   Amlodipine 10 mg daily  Losartan 100 mg  leflunomide 20 mg daily    GERD- ok, but insurance stopped covering Protonix 1 week ago.  Ran out of the medication.  Raynauds- active as noted above  Constipation- none  Skin thickening-stable  Oral ulcers- none  Digital ulcers- yes as noted above, not completed healed   Kidney problems-no  Joint pains/swelling-none      5 point ROS negative except noted above  I had reviewed past medical and family histories together with allergy and medication lists documented.    Medications:  Outpatient Medications Marked as Taking for the 3/1/24 encounter (Office Visit) with Judd Douglas MD   Medication Sig Dispense Refill     sildenafil 20 MG Oral Tab Take 1 tablet (20 mg total) by mouth 3 (three) times daily. 270 tablet 0    pantoprazole 40 MG Oral Tab EC Take 1 tablet (40 mg total) by mouth After dinner. Take 2 hours after dinner 90 tablet 1    leflunomide 20 MG Oral Tab Take 1 tablet (20 mg total) by mouth daily. 90 tablet 0    mycophenolate sodium 360 MG Oral Tab EC TAKE 3 TABLETS BY MOUTH TWICE DAILY BEFORE MEALS 540 tablet 0    ketoconazole 2 % External Cream Use twice daily x 14 days now and in April 2024 for skin fungal infection 30 g 2    amLODIPine 10 MG Oral Tab Take 1 tablet (10 mg total) by mouth daily. 90 tablet 3    hydroxychloroquine 200 MG Oral Tab Hydroxychloroquine (plaquenil): 1 tab twice daily. 180 tablet 1    metoprolol tartrate 25 MG Oral Tab Take 1 tablet (25 mg total) by mouth 2 (two) times daily.      losartan 100 MG Oral Tab Take 1 tablet (100 mg total) by mouth daily. 90 tablet 1     Modified Medications    Modified Medication Previous Medication    PANTOPRAZOLE 40 MG ORAL TAB EC pantoprazole 40 MG Oral Tab EC       Take 1 tablet (40 mg total) by mouth After dinner. Take 2 hours after dinner    Take 1 tablet (40 mg total) by mouth After dinner. Take 2 hours after dinner     Medications Discontinued During This Encounter   Medication Reason    sildenafil 20 MG Oral Tab     pantoprazole 40 MG Oral Tab EC        ?  Allergies:  No Known Allergies      Objective    Vitals:    03/01/24 1311   BP: 102/76   Pulse: 92   Resp: 16   Temp: 97.8 °F (36.6 °C)   SpO2: 97%   Weight: 222 lb 12.8 oz (101.1 kg)   Height: 6' 4\" (1.93 m)       GEN: NAD, well-nourished.   HEENT: Head: NCAT. Face: No lesions. Eyes: Conjunctiva clear. Sclera are anicteric. PERRLA. EOMs are full.   CV: RRR, no mrg, S1/S2  Chest: Reproducible chest pain  PULM:  CTAB, easy effort  Extremities: No cyanosis, edema or deformities.   Neurologic: Strength, CN2-12 grossly intact   Psych: normal affect.   Skin:   -Hypopigmented patches overlying the knees  bilateral arms and the forearms.    MSK: 28 joint count performed. No evidence of synovitis in mcp, pip, dip, wrist, elbows, shoulders, hips, knees, ankles, mtp unless otherwise noted. Full ROM of elbows, wrists, knees.     1+ skin thickening overlying anterior chest, in bilateral forearms between MCPs/PIPs and PIPs/DIPs.  Otherwise skin throughout is soft  persistent ulcers appreciated over extensor surface of left PIP 5 > right PIP5 (improved).  No open wound or drainage appreciated.  Surrounding skin is not erythematous and nontender.    -Scattered developing ulcers overlying the bilateral toe PIPs.    Labs:  9/2023  UPCR 45/286  CMP, CBC W differential WNL    Additional Labs:    Lab Results   Component Value Date    WBC 7.8 11/29/2023    RBC 4.72 11/29/2023    HGB 14.3 11/29/2023    HCT 42.5 11/29/2023     11/29/2023    MCV 90.0 11/29/2023    MCH 30.3 11/29/2023    MCHC 33.6 11/29/2023    RDW 13.3 11/29/2023    NEPERCENT 63.1 11/29/2023    LYPERCENT 23.8 11/29/2023    MOPERCENT 11.7 11/29/2023    EOPERCENT 0.6 11/29/2023    BAPERCENT 0.8 11/29/2023    NE 4,922 11/29/2023    LYMABS 1,856 11/29/2023    MOABSO 913 11/29/2023    EOABSO 47 11/29/2023    BAABSO 62 11/29/2023     Lab Results   Component Value Date    GLU 95 11/29/2023    BUN 11 11/29/2023    BUNCREA SEE NOTE: 11/29/2023    CREATSERUM 0.98 11/29/2023    GFRNAA 83 04/08/2022    GFRAA 97 04/08/2022    CA 9.7 11/29/2023    ALKPHO 59 11/29/2023    AST 16 11/29/2023    ALT 12 11/29/2023    BILT 0.4 11/29/2023    TP 7.1 11/29/2023    ALB 4.4 11/29/2023    GLOBULIN 2.7 11/29/2023    AGRATIO 1.6 11/29/2023     11/29/2023    K 3.9 11/29/2023     11/29/2023    CO2 26 11/29/2023 2/2023  CK3 54  UPCR 12/190  U/A WNL  CRP 2.1 mg/L  Sed rate 28  Creatinine 1.27, rest of CMP WNL  CBC with differential WNL    4/2022  comprehensive metabolic panel (CMP) and complete blood count with differential (CBC w/ diff) wnl    2/2022 Labs:   CK  346  Sed rate 6  CRP 1.1 mg/L  Urine protein 24  Urine creatinine 162  U/a with trace protein, no RBCs or WBCs  WBC 8.2, hemoglobin 15.9, platelets 319  Creatinine 1.29, calcium 11.0, albumin 5.0, rest of CMP normal    ILD Serologies: RNA natalie III elevated, OPAL 1:320 (speckled). Scl-70, SSA, SSB, Sm, RNP negative. Myomarker negative.        4/2021  EKG wnl    4/2021 TTE: [facility]           PFT (02/14/2020, at [facility] and scanned to media tab): Moderate restrictive ventilatory defect with normal diffusing capacity  FVC 4.20/68 -> 4.02/66 (-4% post-BD)  FEV1 3.43/74 -> 3.39/74 (-1% post-BD)  Ratio 82 -> 84  TLC 5.27/62  FRC 2.86/65  RV 1.08/46  DLCO 30.5/89      (ABNORMAL) Complete PFT (04/19/2022 1:18 PM CDT)  (ABNORMAL) Complete PFT (04/19/2022 1:18 PM CDT)   Component Value Ref Range Test Method Analysis Time Performed At [facility]   FVC_PRE 4.29 (L) 4.86 - 7.30 L     [facility]     FEV1_PRE 3.46 (L) 3.64 - 5.45 L     [facility]     FEV1/FVC_PRE 80.61 61.65 - 87.28 %     [facility]     DLCO_SB_PRE 29.06 27.32 - 43.90 ml/(min*mmHg)     [facility]      8/2023 PFTs  Findings:  FEV1 is 3.48L, 80% predicted.  FVC is 4.12L, 76% predicted.  FEV1/ FVC ratio is 0.85.  The flow-volume loop demonstrates a normal pattern.  The TLC is 6.11L, 73% predicted.  The residual volume 1.98L, 93% predicted.  The diffusion capacity is 63% predicted and 102% predicted when corrected for alveolar volume.     Radiology:    3/2022 HRCT: wn  3/4/2022: TTE: wnl. Grade 1 diastolic dysfunction.   8/2023: TTE: Doppler parameters are consistent with abnormal left      ventricular relaxation - grade 1 diastolic dysfunction. PASP: 29.     Radiology review:      =====================================================================================================  Assessment and Plan    Assessment:  1. Scleroderma (HCC)    2. Therapeutic drug monitoring    3. Raynaud's disease without gangrene    4. Gastritis without bleeding, unspecified  chronicity, unspecified gastritis type    5. Gastroesophageal reflux disease, unspecified whether esophagitis present    6. Seronegative rheumatoid arthritis (HCC)    7. Costochondritis    8. 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  Previously followed by rheumatology and pulmonary at [facility].  Clinical Manifestations: Raynaud's (since late 2018), sclerodactyly, diffuse skin thickening, puffy fingers, GERD, and digital ulcers, cardiomyopathy of unclear etiology (EF 41% in 2021 [normal EF in 2023], no TR noted on TTE), restrictive defect on PFTs (no ILD on HRCT in 2022 or CT chest from 2/2024).   Serologies/Laboratory findings:  OPAL at 1:320 and RNA polymerase III   Medications:  MMF 1500mg PO BID (started 01/2020, increased to 1500mg in 06/2020, ran out since 09/2020-03/2021 because insurance lapse), then was out of medication for another several months. He has restarted CellCept 1gm BID 2/2022 but had issues with diarrhea. Now on myfortic 1080 mg BID (since 2/2022) which he is tolerating.     #Sclerodactyly: Stable  -Flexion contracture stable    #Digital ulcers:  -development of digital ulcerations overlying the right MCP3 and left PIP5 1 month after discontinuing amlodipine.  After starting amlodipine, right MCP 3 digital ulcer has healed, left PIP 5 digital ulcer remains.  Right PIP 5 digital ulcer has developed.  3 digital ulcers of the bilateral toe PIPs noted today.    #Raynaud's:   -partially improved with amlodipine + losartan    #Seronegative rheumatoid arthritis: Quiet today    #GERD: controlled with PPI    #Reproducible chest pain: Due to costochondritis    #tinea/cutaneous fungal infection on the arms: stable, Rx'ed ketoconazole in the past.   High risk medication labs including CMP and CBC w/ diff reviewed from 11/2023.  Results are stable. Ophth: Andres Whitehead: exam from  2023 wnl.  Hepatitis B surface antigen, HCV negative in 2020    Plan:  -previously prescribed. Occupational therapy to work on sclerodactyly and to improve finger ROM  -get blood work now and in 3 months.  For monitoring of leflunomide and Myfortic.  Also add on CK/ Mag this next lab visit.   -continue amlodipine 10 mg daily and losartan 100 mg daily for Raynauds and digital ulcers  -Given refractory digital ulcers in the context of Raynaud's, add on sildenafil 20 mg 3 times daily.  Untreated digital ulcer may lead to worsening, infection of the finger, potential irreversible arterial insufficiency/gangrene.  Need prior authorization.  -apply vaseline overlying ulcers.  -continue leflunomide 20 mg daily  -cont hydroxychloroquine (plaquenil) 200 mg twice daily for inflammatory arthritis  -continue myfortic (mycophenolic sodium) 1080 mg (3 pills) in the morning and  1080 mg (3 pills) in the evening.    -If sclerodactyly/soft tissue thickening overlying the hands worsens, consider adding rituximab therapy in the future.  -continue pantoprazole for Ssc GERD; need updated PA, per patient insurance not covering.  -Gave phone number to smoking cessation clinic.  Quitting smoking will help with Raynaud's and digital ulcers.    -Recent PFTs in 2023 are stable  -Recent TTE in 2023 with evidence of diastolic dysfunction, patient will be seeing cardiology in the near future  -Discussed previously, patient defers vaccinations for flu, COVID, pneumococcal vaccine.    -rtc 3 months      Total time spent by me on DOS was 45 min on 3/1/2024  This includes:   Preparing to see the patient (review of tests, prior medical visits)  Obtaining and/or reviewing separately medically appropriate obtained history  Performing the medically appropriate exam and/or evaluation  Clinical documentation in the EHR or other health record  Counseling and educating the patient or caregiver  Interpreting results and/or communicating results to the  patient/family/caregiver  Care coordination  Ordering medications, tests, or procedures  Documentation in EHR      Diagnoses and all orders for this visit:    Scleroderma (HCC)  -     Comp Metabolic Panel (14)  -     CBC With Differential With Platelet  -     Urinalysis with Culture Reflex  -     CK (Creatine Kinase) (Not Creatinine)  -     Magnesium  -     Discontinue: sildenafil 20 MG Oral Tab; Take 1 tablet (20 mg total) by mouth 3 (three) times daily.  -     Comp Metabolic Panel (14); Future  -     CBC With Differential With Platelet; Future  -     sildenafil 20 MG Oral Tab; Take 1 tablet (20 mg total) by mouth 3 (three) times daily.  -     pantoprazole 40 MG Oral Tab EC; Take 1 tablet (40 mg total) by mouth After dinner. Take 2 hours after dinner    Therapeutic drug monitoring  -     Comp Metabolic Panel (14)  -     CBC With Differential With Platelet  -     Urinalysis with Culture Reflex  -     CK (Creatine Kinase) (Not Creatinine)  -     Magnesium  -     Discontinue: sildenafil 20 MG Oral Tab; Take 1 tablet (20 mg total) by mouth 3 (three) times daily.  -     Comp Metabolic Panel (14); Future  -     CBC With Differential With Platelet; Future  -     sildenafil 20 MG Oral Tab; Take 1 tablet (20 mg total) by mouth 3 (three) times daily.    Raynaud's disease without gangrene  -     Comp Metabolic Panel (14)  -     CBC With Differential With Platelet  -     Urinalysis with Culture Reflex  -     CK (Creatine Kinase) (Not Creatinine)  -     Magnesium  -     Discontinue: sildenafil 20 MG Oral Tab; Take 1 tablet (20 mg total) by mouth 3 (three) times daily.  -     Comp Metabolic Panel (14); Future  -     CBC With Differential With Platelet; Future  -     sildenafil 20 MG Oral Tab; Take 1 tablet (20 mg total) by mouth 3 (three) times daily.    Gastritis without bleeding, unspecified chronicity, unspecified gastritis type  -     pantoprazole 40 MG Oral Tab EC; Take 1 tablet (40 mg total) by mouth After dinner. Take 2  hours after dinner    Gastroesophageal reflux disease, unspecified whether esophagitis present  -     pantoprazole 40 MG Oral Tab EC; Take 1 tablet (40 mg total) by mouth After dinner. Take 2 hours after dinner    Seronegative rheumatoid arthritis (HCC)  -     pantoprazole 40 MG Oral Tab EC; Take 1 tablet (40 mg total) by mouth After dinner. Take 2 hours after dinner    Costochondritis    Skin ulcer of finger, limited to breakdown of skin (HCC)            No follow-ups on file.      The above plan of care, diagnosis, orders, and follow-up were discussed with the patient. Questions related to this recommended plan of care were answered.    Thank you for referring this delightful patient to me. Please feel free to contact me with any questions.     This report was performed utilizing speech recognition software technology. Despite proofreading, speech recognition errors could escape detection. If a word or phrase is confusing or out of context, please do not hesitate to call for   clarification.       Kind regards      Judd Douglas MD  [facility]

## 2024-03-01 NOTE — PATIENT INSTRUCTIONS
Will start prior authorization for protonix since they did refill this.     Will start authorization for sildenafil for your Raynaud's    If you are a current smoker and want to quit, the Smoking Cessation Clinic at [facility] is here to help. The program combines medical and behavioral therapy, and includes counseling with a nurse practitioner (Janny Sears RN, APN-BC, AOCN or Sofy Newton RN, APN-BC) virtually or at one of our cancer centers in [place] or [place]. To schedule a smoking cessation appointment, call [phone] ([facility]).

## 2024-03-04 ENCOUNTER — TELEPHONE (OUTPATIENT)
Dept: RHEUMATOLOGY | Facility: CLINIC | Age: 39
End: 2024-03-04

## 2024-03-04 NOTE — TELEPHONE ENCOUNTER
PA for Sildenafil   Prior authorization approved   Case ID: [id number]      Payer: [employer] [facility]    [phone]    [phone]   The case has been Approved from  [id number] to [id number]   Approval Details    Authorized from January 1, 2024 to March 2, 2025

## 2024-03-05 LAB
ABSOLUTE BASOPHILS: 50 CELLS/UL (ref 0–200)
ABSOLUTE EOSINOPHILS: 50 CELLS/UL (ref 15–500)
ABSOLUTE LYMPHOCYTES: 1873 CELLS/UL (ref 850–3900)
ABSOLUTE MONOCYTES: 1159 CELLS/UL (ref 200–950)
ABSOLUTE NEUTROPHILS: 5267 CELLS/UL (ref 1500–7800)
ALBUMIN/GLOBULIN RATIO: 1.6 (CALC) (ref 1–2.5)
ALBUMIN: 4.4 G/DL (ref 3.6–5.1)
ALKALINE PHOSPHATASE: 64 U/L (ref 36–130)
ALT: 15 U/L (ref 9–46)
APPEARANCE: CLEAR
AST: 19 U/L (ref 10–40)
BASOPHILS: 0.6 %
BILIRUBIN, TOTAL: 0.4 MG/DL (ref 0.2–1.2)
BILIRUBIN: NEGATIVE
BUN: 13 MG/DL (ref 7–25)
CALCIUM: 10.1 MG/DL (ref 8.6–10.3)
CARBON DIOXIDE: 26 MMOL/L (ref 20–32)
CHLORIDE: 101 MMOL/L (ref 98–110)
COLOR: YELLOW
CREATINE KINASE, TOTAL: 262 U/L (ref 44–196)
CREATININE: 1.24 MG/DL (ref 0.6–1.26)
EGFR: 76 ML/MIN/1.73M2
EOSINOPHILS: 0.6 %
GLOBULIN: 2.7 G/DL (CALC) (ref 1.9–3.7)
GLUCOSE: 96 MG/DL (ref 65–99)
GLUCOSE: NEGATIVE
HEMATOCRIT: 42 % (ref 38.5–50)
HEMOGLOBIN: 13.8 G/DL (ref 13.2–17.1)
KETONES: NEGATIVE
LEUKOCYTE ESTERASE: NEGATIVE
LYMPHOCYTES: 22.3 %
MAGNESIUM: 2.1 MG/DL (ref 1.5–2.5)
MCH: 29.2 PG (ref 27–33)
MCHC: 32.9 G/DL (ref 32–36)
MCV: 89 FL (ref 80–100)
MONOCYTES: 13.8 %
MPV: 8.9 FL (ref 7.5–12.5)
NEUTROPHILS: 62.7 %
NITRITE: NEGATIVE
OCCULT BLOOD: NEGATIVE
PLATELET COUNT: 301 THOUSAND/UL (ref 140–400)
POTASSIUM: 3.9 MMOL/L (ref 3.5–5.3)
PROTEIN, TOTAL: 7.1 G/DL (ref 6.1–8.1)
RDW: 13.6 % (ref 11–15)
RED BLOOD CELL COUNT: 4.72 MILLION/UL (ref 4.2–5.8)
SODIUM: 137 MMOL/L (ref 135–146)
SPECIFIC GRAVITY: 1.02 (ref 1–1.03)
WHITE BLOOD CELL COUNT: 8.4 THOUSAND/UL (ref 3.8–10.8)

## 2024-04-10 ENCOUNTER — TELEPHONE (OUTPATIENT)
Dept: RHEUMATOLOGY | Facility: CLINIC | Age: 39
End: 2024-04-10

## 2024-05-31 DIAGNOSIS — M06.00 SERONEGATIVE RHEUMATOID ARTHRITIS (HCC): ICD-10-CM

## 2024-05-31 DIAGNOSIS — M34.9 SCLERODERMA (HCC): ICD-10-CM

## 2024-05-31 DIAGNOSIS — K29.70 GASTRITIS WITHOUT BLEEDING, UNSPECIFIED CHRONICITY, UNSPECIFIED GASTRITIS TYPE: ICD-10-CM

## 2024-05-31 RX ORDER — LEFLUNOMIDE 20 MG/1
20 TABLET ORAL DAILY
Qty: 90 TABLET | Refills: 0 | Status: SHIPPED | OUTPATIENT
Start: 2024-05-31

## 2024-05-31 NOTE — TELEPHONE ENCOUNTER
LOV:    03/01/2024    Future Appointments   Date Time Provider Department Center   6/11/2024 10:00 AM Judd Douglas MD [id number]       LF:  02/27/2024    QTY:   90    Refills:   0    LABS:     Component      Latest Ref Rng 3/4/2024   WBC      3.8 - 10.8 Thousand/uL 8.4    RBC      4.20 - 5.80 Million/uL 4.72    Hemoglobin      13.2 - 17.1 g/dL 13.8    Hematocrit      38.5 - 50.0 % 42.0    MCV      80.0 - 100.0 fL 89.0    MCH      27.0 - 33.0 pg 29.2    MCHC      32.0 - 36.0 g/dL 32.9    RDW      11.0 - 15.0 % 13.6    Platelet Count      140 - 400 Thousand/uL 301    MPV      7.5 - 12.5 fL 8.9    Neutrophils Absolute      1,500 - 7,800 cells/uL 5,267    Lymphocytes Absolute      850 - 3,900 cells/uL 1,873    Monocytes Absolute      200 - 950 cells/uL 1,159 (H)    Eosinophils Absolute      15 - 500 cells/uL 50    Basophils Absolute      0 - 200 cells/uL 50    Neutrophils %      % 62.7    Lymphocytes %      % 22.3    Monocytes %      % 13.8    Eosinophils %      % 0.6    Basophils %      % 0.6    Glucose      65 - 99 mg/dL 96    BUN      7 - 25 mg/dL 13    CREATININE      0.60 - 1.26 mg/dL 1.24    EGFR      > OR = 60 mL/min/1.73m2 76    BUN/CREATININE RATIO      6 - 22 (calc) SEE NOTE:    Sodium      135 - 146 mmol/L 137    Potassium      3.5 - 5.3 mmol/L 3.9    Chloride      98 - 110 mmol/L 101    Carbon Dioxide, Total      20 - 32 mmol/L 26    CALCIUM      8.6 - 10.3 mg/dL 10.1    PROTEIN, TOTAL      6.1 - 8.1 g/dL 7.1    Albumin      3.6 - 5.1 g/dL 4.4    Globulin      1.9 - 3.7 g/dL (calc) 2.7    A/G Ratio      1.0 - 2.5 (calc) 1.6    Total Bilirubin      0.2 - 1.2 mg/dL 0.4    Alkaline Phosphatase      36 - 130 U/L 64    AST (SGOT)      10 - 40 U/L 19    ALT (SGPT)      9 - 46 U/L 15       Legend:  (H) High

## 2024-06-11 ENCOUNTER — OFFICE VISIT (OUTPATIENT)
Dept: RHEUMATOLOGY | Facility: CLINIC | Age: 39
End: 2024-06-11
Payer: MEDICAID

## 2024-06-11 VITALS
HEIGHT: 76 IN | WEIGHT: 222.63 LBS | DIASTOLIC BLOOD PRESSURE: 82 MMHG | OXYGEN SATURATION: 94 % | TEMPERATURE: 98 F | HEART RATE: 94 BPM | RESPIRATION RATE: 16 BRPM | SYSTOLIC BLOOD PRESSURE: 120 MMHG | BODY MASS INDEX: 27.11 KG/M2

## 2024-06-11 DIAGNOSIS — D84.821 IMMUNODEFICIENCY DUE TO DRUG THERAPY (HCC): ICD-10-CM

## 2024-06-11 DIAGNOSIS — R06.09 DYSPNEA ON EXERTION: ICD-10-CM

## 2024-06-11 DIAGNOSIS — Z79.899 IMMUNODEFICIENCY DUE TO DRUG THERAPY (HCC): ICD-10-CM

## 2024-06-11 DIAGNOSIS — F17.200 SMOKING: ICD-10-CM

## 2024-06-11 DIAGNOSIS — G62.9 NEUROPATHY: ICD-10-CM

## 2024-06-11 DIAGNOSIS — M06.00 SERONEGATIVE RHEUMATOID ARTHRITIS (HCC): ICD-10-CM

## 2024-06-11 DIAGNOSIS — K21.9 GASTROESOPHAGEAL REFLUX DISEASE, UNSPECIFIED WHETHER ESOPHAGITIS PRESENT: ICD-10-CM

## 2024-06-11 DIAGNOSIS — M34.9 SCLERODERMA (HCC): Primary | ICD-10-CM

## 2024-06-11 DIAGNOSIS — I73.00 RAYNAUD'S DISEASE WITHOUT GANGRENE: ICD-10-CM

## 2024-06-11 DIAGNOSIS — Z51.81 THERAPEUTIC DRUG MONITORING: ICD-10-CM

## 2024-06-11 DIAGNOSIS — L98.491 SKIN ULCER OF FINGER, LIMITED TO BREAKDOWN OF SKIN (HCC): ICD-10-CM

## 2024-06-11 DIAGNOSIS — K29.70 GASTRITIS WITHOUT BLEEDING, UNSPECIFIED CHRONICITY, UNSPECIFIED GASTRITIS TYPE: ICD-10-CM

## 2024-06-11 DIAGNOSIS — M25.50 ARTHRALGIA, UNSPECIFIED JOINT: ICD-10-CM

## 2024-06-11 PROCEDURE — 99215 OFFICE O/P EST HI 40 MIN: CPT | Performed by: INTERNAL MEDICINE

## 2024-06-11 RX ORDER — PANTOPRAZOLE SODIUM 40 MG/1
40 TABLET, DELAYED RELEASE ORAL
Qty: 90 TABLET | Refills: 3 | Status: SHIPPED | OUTPATIENT
Start: 2024-06-11 | End: 2024-06-12

## 2024-06-11 RX ORDER — SILDENAFIL CITRATE 20 MG/1
20 TABLET ORAL 3 TIMES DAILY
Qty: 270 TABLET | Refills: 1 | Status: SHIPPED | OUTPATIENT
Start: 2024-06-11 | End: 2024-09-09

## 2024-06-11 NOTE — PROGRESS NOTES
Rheumatology f/u Patient Note  ====================================================================================================  Chief complaint: Scleroderma  ?  PCP  JILLIAN BUTCHER II    =====================================================================================================  HPI 2/2022    Stefan Johnson JrArabella is a 38 year old male   Prior Pulmonary (Dr. Johnson)   Prior Rheum (Dr. Amor, [facility])  Patient here to establish care for scleroderma.  Initially was diagnosed with diffuse scleroderma in January 2020 at [facility].  He was found to have Raynaud's (since end 2018), sclerodactyly, diffuse skin thickening, puffy fingers, GERD, and digital ulcers.  Serologies were consistent with a positive OPAL at 1: 320 and RNA polymerase III and he was started on mycophenolate for skin thickening.  In 2021: He had an echocardiogram which showed an EF of 41% and no TR.  He had PFTs that showed a restrictive defect but normal DLCO.  Immunosuppression:  MMF 1500mg PO BID (started 01/2020, increased to 1500mg in 06/2020, ran out 09/2020-03/2021 because insurance lapse). He has restarted CellCept just 3 days ago after seeing his PCP.  Previously worked as a   Still smoking 2 cigarillos each day.. Previously on chantix, this did not help  EtOH: 2 beers/day.  Used to smoke cannabis.  Stopped  CARVAJAL after moderate activity.   Daughters: 17, 14, 6.   Previously a .   Diarrhea on cellcept.   GERD- bad, used to be on protonix  Raynauds- active  Constipation- none  Skin thickening-yes  Oral ulcers- no  Digital ulcers- in the past, last in 2020  Kidney problems-no. Takes BP  Joint pains/swelling-no  Medications:  MMF 1 gm BID  Losartan 100 mg/HCTZ 12.5 mg  ==============================================================================================================  Visit: 03/01/24  Raynaud's still active.  Better on amlodipine 10 mg daily.  Right PIP 5 digital ulcer almost healed.  However left PIP 5  digital ulcer was healing but then worsened recently.  Patient still smoking 1 cigarette a day.  Trying to quit.  Motivated, not in the smoking cessation clinic yet.  Ulcers developing overlying the dorsum of the bilateral toes.  Still with reproducible chest pain.  CT of the chest completed 2 weeks ago was normal.  No ILD noted.  Medication:   hydroxychloroquine (plaquenil) 200 mg BID .   myfortic 1080 mg BID   Amlodipine 10 mg daily  Losartan 100 mg  leflunomide 20 mg daily  GERD- ok, but insurance stopped covering Protonix 1 week ago.  Ran out of the medication.  Raynauds- active as noted above  Constipation- none  Skin thickening-stable  Oral ulcers- none  Digital ulcers- yes as noted above, not completed healed   Kidney problems-no  Joint pains/swelling-none  ==============================================================================================================  Today's Visit: 06/11/24    Right dorsal fifth finger ulcer (stable), left dorsal fifth finger ulcer developing, stabilized.  Some dorsal foot ulcers from rubbing against shoe.  Still smoking 1-2 cigarillo daily.  Has cut down quite a bit from smoking half pack or more cigarettes per day.  -still with tingling in the feet, no better, no worse.  -Still with some chest tightness with exertion.  This is on and off.  None currently.    GERD- cannot get protonix, insurance will not cover.  Raynauds- still active from time to time but better with amlodipine.  Sildenafil was authorized but did not pick this up at the pharmacy.  Constipation- no  Skin thickening-stable  Oral ulcers- no  Digital ulcers- yes as above  Kidney problems-no  Joint pains/swelling-stable    5 point ROS negative except noted above  I had reviewed past medical and family histories together with allergy and medication lists documented.    Medications:  Outpatient Medications Marked as Taking for the 6/11/24 encounter (Office Visit) with Judd Douglas MD   Medication Sig Dispense  Refill    sildenafil 20 MG Oral Tab Take 1 tablet (20 mg total) by mouth 3 (three) times daily. 270 tablet 1    [DISCONTINUED] pantoprazole 40 MG Oral Tab EC Take 1 tablet (40 mg total) by mouth every morning before breakfast. 90 tablet 3    LEFLUNOMIDE 20 MG Oral Tab TAKE 1 TABLET(20 MG) BY MOUTH DAILY 90 tablet 0    mycophenolate sodium 360 MG Oral Tab EC TAKE 3 TABLETS BY MOUTH TWICE DAILY BEFORE MEALS 540 tablet 0    amLODIPine 10 MG Oral Tab Take 1 tablet (10 mg total) by mouth daily. 90 tablet 3    hydroxychloroquine 200 MG Oral Tab Hydroxychloroquine (plaquenil): 1 tab twice daily. 180 tablet 1    metoprolol tartrate 25 MG Oral Tab Take 1 tablet (25 mg total) by mouth daily.      losartan 100 MG Oral Tab Take 1 tablet (100 mg total) by mouth daily. 90 tablet 1     Modified Medications    No medications on file     Medications Discontinued During This Encounter   Medication Reason    ketoconazole 2 % External Cream     pantoprazole 40 MG Oral Tab EC     sildenafil 20 MG Oral Tab        ?  Allergies:  No Known Allergies      Objective    Vitals:    06/11/24 1007   BP: 120/82   Pulse: 94   Resp: 16   Temp: 98.1 °F (36.7 °C)   SpO2: 94%   Weight: 222 lb 9.6 oz (101 kg)   Height: 6' 4\" (1.93 m)       GEN: NAD, well-nourished.   HEENT: Head: NCAT. Face: No lesions. Eyes: Conjunctiva clear. Sclera are anicteric. PERRLA. EOMs are full.   CV: RRR, no mrg, S1/S2  PULM:  CTAB, easy effort  Extremities: No cyanosis, edema or deformities.   Neurologic: Strength, CN2-12 grossly intact   Psych: normal affect.     Skin:   -Hypopigmented patches overlying the knees bilateral arms and the forearms.    MSK: 28 joint count performed. No evidence of synovitis in mcp, pip, dip, wrist, elbows, shoulders, hips, knees, ankles, mtp unless otherwise noted. Full ROM of elbows, wrists, knees.     Scattered tenderness of MCPs and PIPs equaling 4 tender joints.  No overt swollen joints.    1+ skin thickening overlying anterior chest, in  bilateral forearms between MCPs/PIPs and PIPs/DIPs.  Otherwise skin throughout is soft  persistent ulcers appreciated over extensor surface of bilateral PIP5(improved).  No open wound or drainage appreciated.  Surrounding skin is not erythematous and nontender.    -Scattered developing ulcers overlying the bilateral toe PIPs.    Labs:    4/4/2024  Creatinine 1.15, rest of CMP WNL  TSH WNL  CBC W differential WNL      9/2023  UPCR 45/286  CMP, CBC W differential WNL    Additional Labs:    Lab Results   Component Value Date    WBC 8.4 03/04/2024    RBC 4.72 03/04/2024    HGB 13.8 03/04/2024    HCT 42.0 03/04/2024     03/04/2024    MCV 89.0 03/04/2024    MCH 29.2 03/04/2024    MCHC 32.9 03/04/2024    RDW 13.6 03/04/2024    NEPERCENT 62.7 03/04/2024    LYPERCENT 22.3 03/04/2024    MOPERCENT 13.8 03/04/2024    EOPERCENT 0.6 03/04/2024    BAPERCENT 0.6 03/04/2024    NE 5,267 03/04/2024    LYMABS 1,873 03/04/2024    MOABSO 1,159 (H) 03/04/2024    EOABSO 50 03/04/2024    BAABSO 50 03/04/2024     Lab Results   Component Value Date    GLU 96 03/04/2024    BUN 13 03/04/2024    BUNCREA SEE NOTE: 03/04/2024    CREATSERUM 1.24 03/04/2024    GFRNAA 83 04/08/2022    GFRAA 97 04/08/2022    CA 10.1 03/04/2024    ALKPHO 64 03/04/2024    AST 19 03/04/2024    ALT 15 03/04/2024    BILT 0.4 03/04/2024    TP 7.1 03/04/2024    ALB 4.4 03/04/2024    GLOBULIN 2.7 03/04/2024    AGRATIO 1.6 03/04/2024     03/04/2024    K 3.9 03/04/2024     03/04/2024    CO2 26 03/04/2024 2/2023  CK3 54  UPCR 12/190  U/A WNL  CRP 2.1 mg/L  Sed rate 28  Creatinine 1.27, rest of CMP WNL  CBC with differential WNL    4/2022  comprehensive metabolic panel (CMP) and complete blood count with differential (CBC w/ diff) wnl    2/2022 Labs:     Sed rate 6  CRP 1.1 mg/L  Urine protein 24  Urine creatinine 162  U/a with trace protein, no RBCs or WBCs  WBC 8.2, hemoglobin 15.9, platelets 319  Creatinine 1.29, calcium 11.0, albumin  5.0, rest of CMP normal    ILD Serologies: RNA natalie III elevated, OPAL 1:320 (speckled). Scl-70, SSA, SSB, Sm, RNP negative. Myomarker negative.        4/2021  EKG wnl    4/2021 TTE: wnl           PFT (02/14/2020, at [facility] and scanned to media tab): Moderate restrictive ventilatory defect with normal diffusing capacity  FVC 4.20/68 -> 4.02/66 (-4% post-BD)  FEV1 3.43/74 -> 3.39/74 (-1% post-BD)  Ratio 82 -> 84  TLC 5.27/62  FRC 2.86/65  RV 1.08/46  DLCO 30.5/89      (ABNORMAL) Complete PFT (04/19/2022 1:18 PM CDT)  (ABNORMAL) Complete PFT (04/19/2022 1:18 PM CDT)   Component Value Ref Range Test Method Analysis Time Performed At [facility]   FVC_PRE 4.29 (L) 4.86 - 7.30 L     [place] [facility]     FEV1_PRE 3.46 (L) 3.64 - 5.45 L     [facility]     FEV1/FVC_PRE 80.61 61.65 - 87.28 %     [facility]     DLCO_SB_PRE 29.06 27.32 - 43.90 ml/(min*mmHg)     [facility]      8/2023 PFTs  Findings:  FEV1 is 3.48L, 80% predicted.  FVC is 4.12L, 76% predicted.  FEV1/ FVC ratio is 0.85.  The flow-volume loop demonstrates a normal pattern.  The TLC is 6.11L, 73% predicted.  The residual volume 1.98L, 93% predicted.  The diffusion capacity is 63% predicted and 102% predicted when corrected for alveolar volume.     Radiology:    3/2022 HRCT: wnl  3/4/2022: TTE: wnl. Grade 1 diastolic dysfunction.   8/2023: TTE: Doppler parameters are consistent with abnormal left      ventricular relaxation - grade 1 diastolic dysfunction. PASP: 29.       2/16/2024  CT chest, small pieripheral bleds, no ILD noted. Diverticulsosis without diverticulitis noted.  Radiology review:      =====================================================================================================  Assessment and Plan    Assessment:  1. Scleroderma (HCC)    2. Therapeutic drug monitoring    3. Raynaud's disease without gangrene    4. Dyspnea on exertion    5. Smoking    6. Neuropathy    7. Arthralgia, unspecified joint    8. Seronegative rheumatoid arthritis  (HCC)    9. Gastritis without bleeding, unspecified chronicity, unspecified gastritis type    10. Gastroesophageal reflux disease, unspecified whether esophagitis present    11. Skin ulcer of finger, limited to breakdown of skin (HCC)    12. 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  Previously followed by rheumatology and pulmonary at [facility].  Clinical Manifestations: Raynaud's (since late 2018), sclerodactyly, diffuse skin thickening, puffy fingers, GERD, and digital ulcers, cardiomyopathy of unclear etiology (EF 41% in 2021 [normal EF in 2023], no TR noted on TTE), restrictive defect on PFTs (no ILD on HRCT in 2022 or CT chest from 2/2024).   Serologies/Laboratory findings:  OPAL at 1:320 and RNA polymerase III   Medications:  MMF 1500mg PO BID (started 01/2020, increased to 1500mg in 06/2020, ran out since 09/2020-03/2021 because insurance lapse), then was out of medication for another several months. He has restarted CellCept 1gm BID 2/2022 but had issues with diarrhea. Now on myfortic 1080 mg BID (since 2/2022) which he is tolerating.     #Sclerodactyly: Stable  -Flexion contracture stable    #Digital ulcers:  -development of digital ulcerations overlying the right MCP3 and left PIP5 1 month after patient discontinued amlodipine.    -Dorsal PIP 5 digital ulcers remain but are stable.  Noninfected  -Sildenafil authorized but patient did not  prescription.    #Raynaud's:   -partially improved with amlodipine + losartan    #Seronegative rheumatoid arthritis: Quiet today    #GERD: controlled with PPI, however has had issues with insurance covering PPI recently.      High risk medication labs including CMP and CBC w/ diff reviewed from  4/4/2024.  Results are stable. Ophth: Andres Whitehead: exam from 2023 wnl.  Hepatitis B surface antigen, HCV negative in 2020    Plan:  -foot parasthesias: Evaluate  for neuropathic pain.  B12/folate, SPEP, TSH, iron studies, A1c  -previously prescribed. Occupational therapy to work on sclerodactyly and to improve finger ROM; greater rate and next visit  -get blood work every 3 months for monitoring while on leflunomide and Myfortic.   -continue amlodipine 10 mg daily and losartan 100 mg daily for Raynauds and digital ulcers  -Given refractory digital ulcers in the context of Raynaud's, add on sildenafil 20 mg 3 times daily.  Untreated digital ulcer may lead to worsening, infection of the finger, potential irreversible arterial insufficiency/gangrene.  --Sildenafil already authorized, resent sildenafil to pharmacy.  Urged patient to pick the medication.    -continue leflunomide 20 mg daily  -cont hydroxychloroquine (plaquenil) 200 mg twice daily for inflammatory arthritis  -continue myfortic (mycophenolic sodium) 1080 mg (3 pills) in the morning and  1080 mg (3 pills) in the evening.    -If sclerodactyly/soft tissue thickening overlying the hands worsens, consider adding rituximab therapy in the future.    -continue pantoprazole for Ssc GERD; need updated PA, per patient insurance not covering.  ---Will send Protonix to [facility] pharmacy given insurance issues.    -Gave phone number to smoking cessation clinic.  Quitting smoking will help with Raynaud's and digital ulcers.    -Recent PFTs in 2023 are stable; HRCT from 2/2024 without any ILD  -Recent TTE in 2023 with evidence of diastolic dysfunction, patient previously reported he was to see cardiology in the past.  Did not end up following with cardiology.  -Given persistent chest discomfort with exertion, obtain exercise stress test to evaluate for CAD  -Discussed previously, patient defers vaccinations for flu, COVID, pneumococcal vaccine.    -rtc 3 months        Diagnoses and all orders for this visit:    Scleroderma (HCC)  -     Comp Metabolic Panel (14); Future  -     CBC With Differential With Platelet; Future  -      Urinalysis with Culture Reflex; Future  -     Creatinine, Urine, Random; Future  -     Protein,Total,Urine, Random; Future  -     C-Reactive Protein; Future  -     Sed Rate, Westergren (Automated); Future  -     Comp Metabolic Panel (14); Future  -     CBC With Differential With Platelet; Future  -     Magnesium; Future  -     CK (Creatine Kinase) (Not Creatinine); Future  -     sildenafil 20 MG Oral Tab; Take 1 tablet (20 mg total) by mouth 3 (three) times daily.  -     Discontinue: pantoprazole 40 MG Oral Tab EC; Take 1 tablet (40 mg total) by mouth every morning before breakfast.  -     CARD TREADMILL STRESS, ADULT (CPT=93017); Future  -     B12 AND FOLATE  -     Iron And Tibc  -     Ferritin  -     Hemoglobin A1C  -     Protein Electrophoresis, with Total Protein and Reflex to LINUS, Serum [[id number]] [Q]  -     Vitamin D, 25-Hydroxy    Therapeutic drug monitoring  -     Comp Metabolic Panel (14); Future  -     CBC With Differential With Platelet; Future  -     Urinalysis with Culture Reflex; Future  -     Creatinine, Urine, Random; Future  -     Protein,Total,Urine, Random; Future  -     C-Reactive Protein; Future  -     Sed Rate, Westergren (Automated); Future  -     Comp Metabolic Panel (14); Future  -     CBC With Differential With Platelet; Future  -     Magnesium; Future  -     CK (Creatine Kinase) (Not Creatinine); Future  -     sildenafil 20 MG Oral Tab; Take 1 tablet (20 mg total) by mouth 3 (three) times daily.  -     Discontinue: pantoprazole 40 MG Oral Tab EC; Take 1 tablet (40 mg total) by mouth every morning before breakfast.  -     B12 AND FOLATE  -     Iron And Tibc  -     Ferritin  -     Hemoglobin A1C  -     Protein Electrophoresis, with Total Protein and Reflex to LINUS, Serum [[id number]] [Q]  -     Vitamin D, 25-Hydroxy    Raynaud's disease without gangrene  -     sildenafil 20 MG Oral Tab; Take 1 tablet (20 mg total) by mouth 3 (three) times daily.  -     Discontinue: pantoprazole 40 MG Oral Tab EC;  Take 1 tablet (40 mg total) by mouth every morning before breakfast.    Dyspnea on exertion  -     CARD TREADMILL STRESS, ADULT (CPT=93017); Future    Smoking  -     CARD TREADMILL STRESS, ADULT (CPT=93017); Future    Neuropathy  -     B12 AND FOLATE  -     Iron And Tibc  -     Ferritin  -     Hemoglobin A1C  -     Protein Electrophoresis, with Total Protein and Reflex to LINUS, Serum [[id number]] [Q]  -     Vitamin D, 25-Hydroxy    Arthralgia, unspecified joint  -     B12 AND FOLATE  -     Iron And Tibc  -     Ferritin  -     Hemoglobin A1C  -     Protein Electrophoresis, with Total Protein and Reflex to LINUS, Serum [[id number]] [Q]  -     Vitamin D, 25-Hydroxy    Seronegative rheumatoid arthritis (HCC)    Gastritis without bleeding, unspecified chronicity, unspecified gastritis type    Gastroesophageal reflux disease, unspecified whether esophagitis present    Skin ulcer of finger, limited to breakdown of skin (HCC)    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      The above plan of care, diagnosis, orders, and follow-up were discussed with the patient. Questions related to this recommended plan of care were answered.    Thank you for referring this delightful patient to me. Please feel free to contact me with any questions.     This report was performed utilizing speech recognition software technology. Despite proofreading, speech recognition errors could escape detection. If a word or phrase is confusing or out of context, please do not hesitate to call for   clarification.       Kind regards      Judd Douglas MD  [facility]

## 2024-06-11 NOTE — PATIENT INSTRUCTIONS
-get stress test, if negative, then consider seeing gastroenterology    -get blood work in July and in October  For monitoring of leflunomide and Myfortic.  Also add on CK/ Mag this next lab visit. And urine tests  -add on neuropathy blood work   Restart protonix for GERD; sent to [facility].    Check out the following pharmacy.  It may save you some money.  [facility]. Phone: [phone]  [email or web address]      -apply vaseline overlying ulcers.  -continue amlodipine 10 mg daily and losartan 100 mg daily for Raynauds and digital ulcers  -Given refractory digital ulcers in the context of Raynaud's, start on sildenafil 20 mg 3 times daily.      -continue leflunomide 20 mg daily  -cont hydroxychloroquine (plaquenil) 200 mg twice daily for inflammatory arthritis  -continue myfortic (mycophenolic sodium) 1080 mg (3 pills) in the morning and  1080 mg (3 pills) in the evening.        If you are a current smoker and want to quit, the Smoking Cessation Clinic at [facility] is here to help. The program combines medical and behavioral therapy, and includes counseling with a nurse practitioner (Janny Sears RN, APN-BC, AOCN or Sofy Newton RN, APN-BC) virtually or at one of our cancer centers in [place] or [place]. To schedule a smoking cessation appointment, call [phone] ([facility]).  You can also ask your primary care physician about medications for smoking cessation. Need a primary care physician? Call our physician referral at [phone].

## 2024-07-03 ENCOUNTER — TELEPHONE (OUTPATIENT)
Dept: RHEUMATOLOGY | Facility: CLINIC | Age: 39
End: 2024-07-03

## 2024-07-03 DIAGNOSIS — R06.09 DYSPNEA ON EXERTION: Primary | ICD-10-CM

## 2024-07-03 DIAGNOSIS — R94.31 ABNORMAL EKG: ICD-10-CM

## 2024-07-03 DIAGNOSIS — F17.200 SMOKING: ICD-10-CM

## 2024-07-03 NOTE — TELEPHONE ENCOUNTER
6/28/2024 treadmill stress test.  9.1 METS obtained.  -Baseline EKG abnormalities noted.  Stress test was normal.  -Will need to proceed with nuclear stress test.  -Please call patient and let him know to get this nuclear stress test done.  Get at [facility].  Fax this over mail copy to patient.

## 2024-07-05 NOTE — TELEPHONE ENCOUNTER
Called pt and phone just rang. [facility] at [phone]. And mailed order to pts house as well.

## 2024-07-08 ENCOUNTER — TELEPHONE (OUTPATIENT)
Dept: RHEUMATOLOGY | Facility: CLINIC | Age: 39
End: 2024-07-08

## 2024-07-08 DIAGNOSIS — R79.89 LOW VITAMIN D LEVEL: Primary | ICD-10-CM

## 2024-07-08 LAB
% SATURATION: 16 % (CALC) (ref 20–48)
ABSOLUTE BASOPHILS: 54 CELLS/UL (ref 0–200)
ABSOLUTE EOSINOPHILS: 75 CELLS/UL (ref 15–500)
ABSOLUTE LYMPHOCYTES: 1733 CELLS/UL (ref 850–3900)
ABSOLUTE MONOCYTES: 1156 CELLS/UL (ref 200–950)
ABSOLUTE NEUTROPHILS: 7683 CELLS/UL (ref 1500–7800)
ALBUMIN/GLOBULIN RATIO: 1.5 (CALC) (ref 1–2.5)
ALBUMIN: 4.2 G/DL (ref 3.8–4.8)
ALBUMIN: 4.5 G/DL (ref 3.6–5.1)
ALKALINE PHOSPHATASE: 80 U/L (ref 36–130)
ALPHA-1-GLOBULINS: 0.4 G/DL (ref 0.2–0.3)
ALPHA-2-GLOBULINS: 0.8 G/DL (ref 0.5–0.9)
ALT: 14 U/L (ref 9–46)
APPEARANCE: CLEAR
AST: 20 U/L (ref 10–40)
BASOPHILS: 0.5 %
BETA 1 GLOBULIN: 0.5 G/DL (ref 0.4–0.6)
BETA 2 GLOBULIN: 0.5 G/DL (ref 0.2–0.5)
BILIRUBIN, TOTAL: 0.3 MG/DL (ref 0.2–1.2)
BILIRUBIN: NEGATIVE
BUN: 12 MG/DL (ref 7–25)
C-REACTIVE PROTEIN: 6.1 MG/L
CALCIUM: 10 MG/DL (ref 8.6–10.3)
CARBON DIOXIDE: 22 MMOL/L (ref 20–32)
CHLORIDE: 106 MMOL/L (ref 98–110)
COLOR: YELLOW
CREATINE KINASE, TOTAL: 452 U/L (ref 44–196)
CREATININE, RANDOM URINE: 188 MG/DL (ref 20–320)
CREATININE: 1.04 MG/DL (ref 0.6–1.26)
EGFR: 94 ML/MIN/1.73M2
EOSINOPHILS: 0.7 %
FERRITIN: 84 NG/ML (ref 38–380)
FOLATE, SERUM: 6.5 NG/ML
GAMMA GLOBULINS: 1.3 G/DL (ref 0.8–1.7)
GLOBULIN: 3.1 G/DL (CALC) (ref 1.9–3.7)
GLUCOSE: 80 MG/DL (ref 65–99)
GLUCOSE: NEGATIVE
HEMATOCRIT: 43 % (ref 38.5–50)
HEMOGLOBIN A1C: 5.3 % OF TOTAL HGB
HEMOGLOBIN: 13.9 G/DL (ref 13.2–17.1)
IRON BINDING CAPACITY: 341 MCG/DL (CALC) (ref 250–425)
IRON, TOTAL: 54 MCG/DL (ref 50–180)
LEUKOCYTE ESTERASE: NEGATIVE
LYMPHOCYTES: 16.2 %
MAGNESIUM: 2.1 MG/DL (ref 1.5–2.5)
MCH: 29.5 PG (ref 27–33)
MCHC: 32.3 G/DL (ref 32–36)
MCV: 91.3 FL (ref 80–100)
MONOCYTES: 10.8 %
MPV: 9.1 FL (ref 7.5–12.5)
NEUTROPHILS: 71.8 %
NITRITE: NEGATIVE
OCCULT BLOOD: NEGATIVE
PLATELET COUNT: 338 THOUSAND/UL (ref 140–400)
POTASSIUM: 4.2 MMOL/L (ref 3.5–5.3)
PROTEIN, TOTAL, RANDOM UR: 46 MG/DL (ref 5–25)
PROTEIN, TOTAL: 7.6 G/DL (ref 6.1–8.1)
PROTEIN, TOTAL: 7.6 G/DL (ref 6.1–8.1)
RDW: 13.6 % (ref 11–15)
RED BLOOD CELL COUNT: 4.71 MILLION/UL (ref 4.2–5.8)
SED RATE BY MODIFIED$WESTERGREN: 17 MM/H
SODIUM: 141 MMOL/L (ref 135–146)
SPECIFIC GRAVITY: 1.02 (ref 1–1.03)
VITAMIN B12: 425 PG/ML (ref 200–1100)
VITAMIN D, 25-OH, TOTAL: 17 NG/ML (ref 30–100)
WHITE BLOOD CELL COUNT: 10.7 THOUSAND/UL (ref 3.8–10.8)

## 2024-07-08 RX ORDER — ERGOCALCIFEROL 1.25 MG/1
50000 CAPSULE ORAL WEEKLY
Qty: 12 CAPSULE | Refills: 0 | Status: SHIPPED | OUTPATIENT
Start: 2024-07-08 | End: 2024-09-30

## 2024-08-19 DIAGNOSIS — M34.9 SCLERODERMA (HCC): ICD-10-CM

## 2024-08-19 DIAGNOSIS — K29.70 GASTRITIS WITHOUT BLEEDING, UNSPECIFIED CHRONICITY, UNSPECIFIED GASTRITIS TYPE: ICD-10-CM

## 2024-08-19 DIAGNOSIS — M06.00 SERONEGATIVE RHEUMATOID ARTHRITIS (HCC): ICD-10-CM

## 2024-08-19 RX ORDER — MYCOPHENOLIC ACID 360 MG/1
TABLET, DELAYED RELEASE ORAL
Qty: 540 TABLET | Refills: 0 | Status: SHIPPED | OUTPATIENT
Start: 2024-08-19

## 2024-08-19 NOTE — TELEPHONE ENCOUNTER
LOV: 6/11/2024    RTC: 3 months   Future Appointments   Date Time Provider Department Center   9/11/2024 11:45 AM Judd Douglas MD [facility]   LABS:   Component      Latest Ref Rng 7/5/2024   WBC      3.8 - 10.8 Thousand/uL 10.7    RBC      4.20 - 5.80 Million/uL 4.71    Hemoglobin      13.2 - 17.1 g/dL 13.9    Hematocrit      38.5 - 50.0 % 43.0    MCV      80.0 - 100.0 fL 91.3    MCH      27.0 - 33.0 pg 29.5    MCHC      32.0 - 36.0 g/dL 32.3    RDW      11.0 - 15.0 % 13.6    Platelet Count      140 - 400 Thousand/uL 338    MPV      7.5 - 12.5 fL 9.1    Neutrophils Absolute      1,500 - 7,800 cells/uL 7,683    Lymphocytes Absolute      850 - 3,900 cells/uL 1,733    Monocytes Absolute      200 - 950 cells/uL 1,156 (H)    Eosinophils Absolute      15 - 500 cells/uL 75    Basophils Absolute      0 - 200 cells/uL 54    Neutrophils %      % 71.8    Lymphocytes %      % 16.2    Monocytes %      % 10.8    Eosinophils %      % 0.7    Basophils %      % 0.5    Color Urine      YELLOW  YELLOW    APPEARANCE      CLEAR  CLEAR    SPECIFIC GRAVITY      1.001 - 1.035  1.019    PH      5.0 - 8.0  < OR = 5.0    GLUCOSE      NEGATIVE  NEGATIVE    Bilirubin Urine      NEGATIVE  NEGATIVE    KETONES      NEGATIVE  TRACE !    OCCULT BLOOD      NEGATIVE  NEGATIVE    Protein Urine      NEGATIVE  1+ !    Protein Urine      5 - 25 mg/dL 46 (H)    NITRITE      NEGATIVE  NEGATIVE    Leukocyte Esterase       NEGATIVE  NEGATIVE    WBC Urine      < OR = 5 /HPF NONE SEEN    RED BLOOD CELLS      < OR = 2 /HPF NONE SEEN    SQUAMOUS EPITHELIAL CELLS      < OR = 5 /HPF NONE SEEN    BACTERIA      NONE SEEN /HPF NONE SEEN    HYALINE CAST      NONE SEEN /LPF NONE SEEN    NOTE --    REFLEXIVE URINE CULTURE --    Glucose      65 - 99 mg/dL 80    BUN      7 - 25 mg/dL 12    CREATININE      0.60 - 1.26 mg/dL 1.04    EGFR      > OR = 60 mL/min/1.73m2 94    BUN/CREATININE RATIO      6 - 22 (calc) SEE NOTE:    Sodium      135 - 146 mmol/L 141     Potassium      3.5 - 5.3 mmol/L 4.2    Chloride      98 - 110 mmol/L 106    Carbon Dioxide, Total      20 - 32 mmol/L 22    CALCIUM      8.6 - 10.3 mg/dL 10.0    PROTEIN, TOTAL      6.1 - 8.1 g/dL 7.6    PROTEIN, TOTAL      6.1 - 8.1 g/dL 7.6    Albumin      3.8 - 4.8 g/dL 4.2    Albumin      3.6 - 5.1 g/dL 4.5    Globulin      1.9 - 3.7 g/dL (calc) 3.1    A/G Ratio      1.0 - 2.5 (calc) 1.5    Total Bilirubin      0.2 - 1.2 mg/dL 0.3    Alkaline Phosphatase      36 - 130 U/L 80    AST (SGOT)      10 - 40 U/L 20    ALT (SGPT)      9 - 46 U/L 14    ALPHA-1-GLOBULINS      0.2 - 0.3 g/dL 0.4 (H)    ALPHA-2-GLOBULINS      0.5 - 0.9 g/dL 0.8    BETA 1 GLOBULINL ELPH-MCNC      0.4 - 0.6 g/dL 0.5    BETA 2 GLOBULIN      0.2 - 0.5 g/dL 0.5    GAMMA GLOBULINS      0.8 - 1.7 g/dL 1.3    INTERPRETATION --    IRON, TOTAL      50 - 180 mcg/dL 54    IRON BINDING CAPACITY      250 - 425 mcg/dL (calc) 341    % SATURATION      20 - 48 % (calc) 16 (L)    Vitamin B12      200 - 1,100 pg/mL 425    FOLATE, SERUM      ng/mL 6.5    CREATININE, RANDOM URINE      20 - 320 mg/dL 188    C-REACTIVE PROTEIN      <8.0 mg/L 6.1    SED RATE BY MODIFIED$WESTERGREN      < OR = 15 mm/h 17 (H)    Magnesium, Serum      1.5 - 2.5 mg/dL 2.1    CREATINE KINASE, TOTAL      44 - 196 U/L 452 (H)    FERRITIN      38 - 380 ng/mL 84    VITAMIN D, 25-OH, TOTAL      30 - 100 ng/mL 17 (L)    HEMOGLOBIN A1c      <5.7 % of total Hgb 5.3       Legend:  (H) High  ! Abnormal  (L) Low    LAST REFILL: 2/13/2024, Quantity 540 tablets, Refills 0    Dr Douglas-- orders pending, approve if agreeable.

## 2024-08-24 DIAGNOSIS — K29.70 GASTRITIS WITHOUT BLEEDING, UNSPECIFIED CHRONICITY, UNSPECIFIED GASTRITIS TYPE: ICD-10-CM

## 2024-08-24 DIAGNOSIS — M06.00 SERONEGATIVE RHEUMATOID ARTHRITIS (HCC): Primary | ICD-10-CM

## 2024-08-24 DIAGNOSIS — M34.9 SCLERODERMA (HCC): ICD-10-CM

## 2024-08-26 RX ORDER — LEFLUNOMIDE 20 MG/1
20 TABLET ORAL DAILY
Qty: 90 TABLET | Refills: 0 | Status: SHIPPED | OUTPATIENT
Start: 2024-08-26

## 2024-08-26 NOTE — TELEPHONE ENCOUNTER
Last office visit: 6/11/2024      Next Rheum Apt:9/11/2024 Judd Douglas MD    Last fill: 5/31/2024   90  tabs, 0 refills   Labs:   Lab Results   Component Value Date    CREATSERUM 1.04 07/05/2024    ALKPHO 80 07/05/2024    AST 20 07/05/2024    ALT 14 07/05/2024    BILT 0.3 07/05/2024    TP 7.6 07/05/2024    TP 7.6 07/05/2024    ALB 4.5 07/05/2024       Lab Results   Component Value Date    WBC 10.7 07/05/2024    HGB 13.9 07/05/2024     07/05/2024    NEPERCENT 71.8 07/05/2024    LYPERCENT 16.2 07/05/2024    NE 7,683 07/05/2024    LYMABS 1,733 07/05/2024

## 2024-09-11 ENCOUNTER — OFFICE VISIT (OUTPATIENT)
Dept: RHEUMATOLOGY | Facility: CLINIC | Age: 39
End: 2024-09-11
Payer: MEDICAID

## 2024-09-11 VITALS
RESPIRATION RATE: 16 BRPM | WEIGHT: 222 LBS | BODY MASS INDEX: 27.03 KG/M2 | SYSTOLIC BLOOD PRESSURE: 122 MMHG | OXYGEN SATURATION: 95 % | DIASTOLIC BLOOD PRESSURE: 82 MMHG | TEMPERATURE: 97 F | HEART RATE: 95 BPM | HEIGHT: 76 IN

## 2024-09-11 DIAGNOSIS — M54.12 CERVICAL RADICULOPATHY: ICD-10-CM

## 2024-09-11 DIAGNOSIS — Z79.899 IMMUNODEFICIENCY DUE TO DRUG THERAPY (HCC): ICD-10-CM

## 2024-09-11 DIAGNOSIS — K22.9 ESOPHAGEAL ABNORMALITY: ICD-10-CM

## 2024-09-11 DIAGNOSIS — D84.821 IMMUNODEFICIENCY DUE TO DRUG THERAPY (HCC): ICD-10-CM

## 2024-09-11 DIAGNOSIS — I73.00 RAYNAUD'S DISEASE WITHOUT GANGRENE: ICD-10-CM

## 2024-09-11 DIAGNOSIS — M34.9 SCLERODERMA (HCC): Primary | ICD-10-CM

## 2024-09-11 DIAGNOSIS — R06.09 DYSPNEA ON EXERTION: ICD-10-CM

## 2024-09-11 DIAGNOSIS — K29.70 GASTRITIS WITHOUT BLEEDING, UNSPECIFIED CHRONICITY, UNSPECIFIED GASTRITIS TYPE: ICD-10-CM

## 2024-09-11 DIAGNOSIS — M06.00 SERONEGATIVE RHEUMATOID ARTHRITIS (HCC): ICD-10-CM

## 2024-09-11 DIAGNOSIS — R06.09 DOE (DYSPNEA ON EXERTION): ICD-10-CM

## 2024-09-11 DIAGNOSIS — J84.9 ILD (INTERSTITIAL LUNG DISEASE) (HCC): ICD-10-CM

## 2024-09-11 DIAGNOSIS — Z51.81 THERAPEUTIC DRUG MONITORING: ICD-10-CM

## 2024-09-11 PROCEDURE — 99214 OFFICE O/P EST MOD 30 MIN: CPT | Performed by: INTERNAL MEDICINE

## 2024-09-11 RX ORDER — PANTOPRAZOLE SODIUM 40 MG/1
40 TABLET, DELAYED RELEASE ORAL
Qty: 90 TABLET | Refills: 3 | Status: SHIPPED | OUTPATIENT
Start: 2024-09-11

## 2024-09-11 RX ORDER — SILDENAFIL CITRATE 20 MG/1
20 TABLET ORAL 3 TIMES DAILY
Qty: 270 TABLET | Refills: 3 | Status: SHIPPED | OUTPATIENT
Start: 2024-09-11

## 2024-09-11 NOTE — PROGRESS NOTES
Rheumatology f/u Patient Note  ====================================================================================================  Chief complaint: Scleroderma  ?  PCP  JILLIAN BUTCHER II    =====================================================================================================  HPI 2/2022    Stefan Johnson JrArabella is a 38 year old male   Prior Pulmonary (Dr. Johnson)   Prior Rheum (Dr. Amor, [facility])  Patient here to establish care for scleroderma.  Initially was diagnosed with diffuse scleroderma in January 2020 at [facility].  He was found to have Raynaud's (since end 2018), sclerodactyly, diffuse skin thickening, puffy fingers, GERD, and digital ulcers.  Serologies were consistent with a positive OPAL at 1: 320 and RNA polymerase III and he was started on mycophenolate for skin thickening.  In 2021: He had an echocardiogram which showed an EF of 41% and no TR.  He had PFTs that showed a restrictive defect but normal DLCO.  Immunosuppression:  MMF 1500mg PO BID (started 01/2020, increased to 1500mg in 06/2020, ran out 09/2020-03/2021 because insurance lapse). He has restarted CellCept just 3 days ago after seeing his PCP.  Previously worked as a   Still smoking 2 cigarillos each day.. Previously on chantix, this did not help  EtOH: 2 beers/day.  Used to smoke cannabis.  Stopped  CARVAJAL after moderate activity.   Daughters: 17, 14, 6.   Previously a .   Diarrhea on cellcept.   GERD- bad, used to be on protonix  Raynauds- active  Constipation- none  Skin thickening-yes  Oral ulcers- no  Digital ulcers- in the past, last in 2020  Kidney problems-no. Takes BP  Joint pains/swelling-no  Medications:  MMF 1 gm BID  Losartan 100 mg/HCTZ 12.5 mg  ==============================================================================================================  Visit: 03/01/24  Raynaud's still active.  Better on amlodipine 10 mg daily.  Right PIP 5 digital ulcer almost healed.  However left PIP 5  digital ulcer was healing but then worsened recently.  Patient still smoking 1 cigarette a day.  Trying to quit.  Motivated, not in the smoking cessation clinic yet.  Ulcers developing overlying the dorsum of the bilateral toes.  Still with reproducible chest pain.  CT of the chest completed 2 weeks ago was normal.  No ILD noted.  Medication:   hydroxychloroquine (plaquenil) 200 mg BID .   myfortic 1080 mg BID   Amlodipine 10 mg daily  Losartan 100 mg  leflunomide 20 mg daily  GERD- ok, but insurance stopped covering Protonix 1 week ago.  Ran out of the medication.  Raynauds- active as noted above  Constipation- none  Skin thickening-stable  Oral ulcers- none  Digital ulcers- yes as noted above, not completed healed   Kidney problems-no  Joint pains/swelling-none  ==============================================================================================================  Visit: 06/11/24  Right dorsal fifth finger ulcer (stable), left dorsal fifth finger ulcer developing, stabilized.  Some dorsal foot ulcers from rubbing against shoe.  Still smoking 1-2 cigarillo daily.  Has cut down quite a bit from smoking half pack or more cigarettes per day.  -still with tingling in the feet, no better, no worse.  -Still with some chest tightness with exertion.  This is on and off.  None currently.  GERD- cannot get protonix, insurance will not cover.  Raynauds- still active from time to time but better with amlodipine.  Sildenafil was authorized but did not pick this up at the pharmacy.  Constipation- no  Skin thickening-stable  Oral ulcers- no  Digital ulcers- yes as above  Kidney problems-no  Joint pains/swelling-stable  ==============================================================================================================  Today's Visit: 09/11/24    Nuclear stress test was negative. CARVAJAL with cutting the grass.  Feels like food gets stuck in his throat/chest.  Often eats dinner then falls asleep within 30 minutes.   Obtain Protonix from [facility] which is cheaper.  Cannot increase to twice daily dosing.  Having some financial issues.  -Did start sildenafil 20 mg 3 times daily which did help with digital ulcers.  Ran out of this.  -cigarillo: 1 per day, but only 4 puffs.    GERD- cannot get protonix, insurance will not cover.  This is from [facility].  Active.  Raynauds- still active from time to time but better with amlodipine.  Better in the summer.  Takes sildenafil as needed.  Constipation- no  Skin thickening-stable  Oral ulcers- no  Digital ulcers- yes as above, improving  Kidney problems-no  Joint pains/swelling-stable    5 point ROS negative except noted above  I had reviewed past medical and family histories together with allergy and medication lists documented.    Medications:  Outpatient Medications Marked as Taking for the 9/11/24 encounter (Office Visit) with Judd Douglas MD   Medication Sig Dispense Refill    Losartan Potassium-HCTZ 100-12.5 MG Oral Tab Take 1 tablet by mouth daily.      sildenafil 20 MG Oral Tab Take 1 tablet (20 mg total) by mouth 3 (three) times daily. 270 tablet 3    pantoprazole 40 MG Oral Tab EC Take 1 tablet (40 mg total) by mouth every morning before breakfast. 90 tablet 3    LEFLUNOMIDE 20 MG Oral Tab TAKE 1 TABLET(20 MG) BY MOUTH DAILY 90 tablet 0    MYCOPHENOLATE SODIUM 360 MG Oral Tab EC TAKE 3 TABLETS BY MOUTH TWICE DAILY BEFORE MEALS 540 tablet 0    ergocalciferol 1.25 MG (11720 UT) Oral Cap Take 1 capsule (50,000 Units total) by mouth once a week. 12 capsule 0    amLODIPine 10 MG Oral Tab Take 1 tablet (10 mg total) by mouth daily. 90 tablet 3    hydroxychloroquine 200 MG Oral Tab Hydroxychloroquine (plaquenil): 1 tab twice daily. 180 tablet 1    metoprolol tartrate 25 MG Oral Tab Take 1 tablet (25 mg total) by mouth daily.       Modified Medications    Modified Medication Previous Medication    PANTOPRAZOLE 40 MG ORAL TAB EC pantoprazole 40 MG Oral Tab EC        Take 1 tablet (40 mg total) by mouth every morning before breakfast.    Take 1 tablet (40 mg total) by mouth every morning before breakfast.     Medications Discontinued During This Encounter   Medication Reason    pantoprazole 40 MG Oral Tab EC          ?  Allergies:  No Known Allergies      Objective    Vitals:    09/11/24 1158   BP: 122/82   Pulse: 95   Resp: 16   Temp: 97 °F (36.1 °C)   SpO2: 95%   Weight: 222 lb (100.7 kg)   Height: 6' 4\" (1.93 m)     GEN: NAD, well-nourished.   HEENT: Head: NCAT. Face: No lesions. Eyes: Conjunctiva clear. Sclera are anicteric. PERRLA. EOMs are full.   CV: RRR, no mrg, S1/S2  PULM:  CTAB, easy effort  Extremities: No cyanosis, edema or deformities.   Neurologic: Strength, CN2-12 grossly intact   -Decrease sensation overlying the third and fourth digits, placing palms on head resolve the decrease sensation.  Psych: normal affect.     Skin:   -Hypopigmented patches overlying the knees bilateral arms and the forearms.    MSK: 28 joint count performed. No evidence of synovitis in mcp, pip, dip, wrist, elbows, shoulders, hips, knees, ankles, mtp unless otherwise noted. Full ROM of elbows, wrists, knees.     No TJ/SJ    1+ skin thickening overlying anterior chest, in bilateral forearms between MCPs/PIPs and PIPs/DIPs.  Otherwise skin throughout is soft  healing ulcers appreciated over extensor surface of bilateral PIP5(improved).  No open wound or drainage appreciated.        Labs:    7/2024  A1c 5.3  Vitamin D 17  B12/folate WNL  Ferritin 84  CBC W differential WNL  Creatinine 1.04, rest of CMP WNL  SPEP/LINUS WNL  CK4 52  Mag 2.1  Sed rate 17  CRP 6.1 mg/dL  UPCR 46/188  U/A WNL    4/4/2024  Creatinine 1.15, rest of CMP WNL  TSH WNL  CBC W differential WNL      9/2023  UPCR 45/286  CMP, CBC W differential WNL    Additional Labs:    Lab Results   Component Value Date    WBC 10.7 07/05/2024    RBC 4.71 07/05/2024    HGB 13.9 07/05/2024    HCT 43.0 07/05/2024     07/05/2024     MCV 91.3 07/05/2024    MCH 29.5 07/05/2024    MCHC 32.3 07/05/2024    RDW 13.6 07/05/2024    NEPERCENT 71.8 07/05/2024    LYPERCENT 16.2 07/05/2024    MOPERCENT 10.8 07/05/2024    EOPERCENT 0.7 07/05/2024    BAPERCENT 0.5 07/05/2024    NE 7,683 07/05/2024    LYMABS 1,733 07/05/2024    MOABSO 1,156 (H) 07/05/2024    EOABSO 75 07/05/2024    BAABSO 54 07/05/2024     Lab Results   Component Value Date    GLU 80 07/05/2024    BUN 12 07/05/2024    BUNCREA SEE NOTE: 07/05/2024    CREATSERUM 1.04 07/05/2024    GFRNAA 83 04/08/2022    GFRAA 97 04/08/2022    CA 10.0 07/05/2024    ALKPHO 80 07/05/2024    AST 20 07/05/2024    ALT 14 07/05/2024    BILT 0.3 07/05/2024    TP 7.6 07/05/2024    TP 7.6 07/05/2024    ALB 4.5 07/05/2024    GLOBULIN 3.1 07/05/2024    AGRATIO 1.5 07/05/2024     07/05/2024    K 4.2 07/05/2024     07/05/2024    CO2 22 07/05/2024 2/2023  CK3 54  UPCR 12/190  U/A WNL  CRP 2.1 mg/L  Sed rate 28  Creatinine 1.27, rest of CMP WNL  CBC with differential WNL    4/2022  comprehensive metabolic panel (CMP) and complete blood count with differential (CBC w/ diff) wnl    2/2022 Labs:     Sed rate 6  CRP 1.1 mg/L  Urine protein 24  Urine creatinine 162  U/a with trace protein, no RBCs or WBCs  WBC 8.2, hemoglobin 15.9, platelets 319  Creatinine 1.29, calcium 11.0, albumin 5.0, rest of CMP normal    ILD Serologies: RNA natalie III elevated, OPAL 1:320 (speckled). Scl-70, SSA, SSB, Sm, RNP negative. Myomarker negative.        4/2021  EKG wnl    4/2021 TTE: wnl           PFT (02/14/2020, at [facility] and scanned to media tab): Moderate restrictive ventilatory defect with normal diffusing capacity  FVC 4.20/68 -> 4.02/66 (-4% post-BD)  FEV1 3.43/74 -> 3.39/74 (-1% post-BD)  Ratio 82 -> 84  TLC 5.27/62  FRC 2.86/65  RV 1.08/46  DLCO 30.5/89      (ABNORMAL) Complete PFT (04/19/2022 1:18 PM CDT)  (ABNORMAL) Complete PFT (04/19/2022 1:18 PM CDT)   Component Value Ref Range Test Method Analysis  Time Performed At [facility]   FVC_PRE 4.29 (L) 4.86 - 7.30 L     [facility]     FEV1_PRE 3.46 (L) 3.64 - 5.45 L     [facility]     FEV1/FVC_PRE 80.61 61.65 - 87.28 %     [facility]     DLCO_SB_PRE 29.06 27.32 - 43.90 ml/(min*mmHg)     [facility]      8/2023 PFTs  Findings:  FEV1 is 3.48L, 80% predicted.  FVC is 4.12L, 76% predicted.  FEV1/ FVC ratio is 0.85.  The flow-volume loop demonstrates a normal pattern.  The TLC is 6.11L, 73% predicted.  The residual volume 1.98L, 93% predicted.  The diffusion capacity is 63% predicted and 102% predicted when corrected for alveolar volume.     Radiology:    3/2022 HRCT: wnl  3/4/2022: TTE: wnl. Grade 1 diastolic dysfunction.   8/2023: TTE: Doppler parameters are consistent with abnormal left      ventricular relaxation - grade 1 diastolic dysfunction. PASP: 29.       2/16/2024  CT chest, small pieripheral bleds, no ILD noted. Diverticulsosis without diverticulitis noted.  Radiology review:      =====================================================================================================  Assessment and Plan    Assessment:  1. Scleroderma ([facility])    2. Therapeutic drug monitoring    3. Esophageal abnormality    4. Raynaud's disease without gangrene    5. Gastritis without bleeding, unspecified chronicity, unspecified gastritis type    6. ILD (interstitial lung disease) ([facility])    7. CARVAJAL (dyspnea on exertion)    8. Immunodeficiency due to drug therapy ([facility])    9. Dyspnea on exertion    10. Seronegative rheumatoid arthritis ([facility])    11. 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  Previously followed by rheumatology and pulmonary at [facility].  Clinical Manifestations: Raynaud's (since late 2018), sclerodactyly, diffuse skin thickening, puffy fingers, GERD, and digital ulcers, cardiomyopathy of unclear etiology (EF 41% in 2021 [normal EF in 2023], no TR noted on TTE), restrictive defect  on PFTs (no ILD on HRCT in 2022 or CT chest from 2/2024).   Serologies/Laboratory findings:  OPAL at 1:320 and RNA polymerase III   Medications:  MMF 1500mg PO BID (started 01/2020, increased to 1500mg in 06/2020, ran out since 09/2020-03/2021 because insurance lapse), then was out of medication for another several months. He has restarted CellCept 1gm BID 2/2022 but had issues with diarrhea. Now on myfortic 1080 mg BID (since 2/2022) which he is tolerating.     #Sclerodactyly: Stable  -Flexion contracture stable    #cervical radiculopathy: Suspect.  Perhaps postural given new pillow was purchased.    #Digital ulcers:  -development of digital ulcerations overlying the right MCP3 and left PIP5 1 month after patient discontinued amlodipine.    -Dorsal PIP 5 digital ulcers remain and are improving with time with amlodipine plus as needed sildenafil.    #Raynaud's:   -partially improved with amlodipine + losartan +prn sildanefil.     #Seronegative rheumatoid arthritis: in remission today    #GERD:   -Suspect active with daily PPI.  He is sleeping right after eating dinner which is likely contributing.    #Dyspnea on exertion: primarily with cutting grass. TTE/PFT/nuclear stress test (from 8/2024) all stable or normal.  He does have a grass allergy.  Perhaps he has had more dyspnea with environmental allergy exposure.    High risk medication labs including CMP and CBC w/ diff reviewed from  7/2024  Results are stable. Ophth: Andres Whitehead: exam from 2023 wnl.  Hepatitis B surface antigen, HCV negative in 2020    Plan:  -previously prescribed. Occupational therapy to work on sclerodactyly and to improve finger ROM; readdress at next visit  -get blood work every 3 months for monitoring while on leflunomide and Myfortic.   -continue amlodipine 10 mg daily and losartan 100 mg daily for Raynauds and digital ulcers  -Given refractory digital ulcers in the context of Raynaud's, continue sildenafil 20 mg 3 times daily      Change out new pillow given ?radiculopathy    -continue leflunomide 20 mg daily  -cont hydroxychloroquine (plaquenil) 200 mg twice daily for inflammatory arthritis  -continue myfortic (mycophenolic sodium) 1080 mg (3 pills) in the morning and  1080 mg (3 pills) in the evening.    -If sclerodactyly/soft tissue thickening overlying the hands worsens, consider adding rituximab therapy in the future.    Continue every 3-month Myfortic/leflunomide monitoring blood work.    -continue pantoprazole for Ssc GERD;   ---continue sending Rx to [facility] given insurance issues.  -- Increase Protonix to twice daily dosing given active GERD.  Space dinner from sleep by at least 2 hours.  --refer to GI for refractory GERD    -Previously gave phone number to smoking cessation clinic.  Quitting smoking will help with Raynaud's and digital ulcers.    -Recent PFTs in 2023 are stable; HRCT from 2/2024 without any ILD; update PFT this year  -Recent TTE in 2023 with evidence of diastolic dysfunction, patient previously reported he was to see cardiology in the past.  Did not end up following with cardiology.  Update TTE this year    -Discussed previously, patient defers vaccinations for flu, COVID, pneumococcal vaccine.    -rtc 4 months      Diagnoses and all orders for this visit:    Scleroderma (HCC)  -     Comp Metabolic Panel (14); Future  -     CBC With Differential With Platelet; Future  -     Comp Metabolic Panel (14); Future  -     CBC With Differential With Platelet; Future  -     Gastro Referral - External  -     sildenafil 20 MG Oral Tab; Take 1 tablet (20 mg total) by mouth 3 (three) times daily.  -     pantoprazole 40 MG Oral Tab EC; Take 1 tablet (40 mg total) by mouth every morning before breakfast.  -     Complete PFT; Future  -     CARD ECHO 2D DOPPLER (CPT=93306); Future    Therapeutic drug monitoring  -     Comp Metabolic Panel (14); Future  -     CBC With Differential With Platelet; Future  -     Comp  Metabolic Panel (14); Future  -     CBC With Differential With Platelet; Future  -     Gastro Referral - External  -     sildenafil 20 MG Oral Tab; Take 1 tablet (20 mg total) by mouth 3 (three) times daily.  -     pantoprazole 40 MG Oral Tab EC; Take 1 tablet (40 mg total) by mouth every morning before breakfast.    Esophageal abnormality  -     Gastro Referral - External  -     sildenafil 20 MG Oral Tab; Take 1 tablet (20 mg total) by mouth 3 (three) times daily.    Raynaud's disease without gangrene  -     sildenafil 20 MG Oral Tab; Take 1 tablet (20 mg total) by mouth 3 (three) times daily.  -     pantoprazole 40 MG Oral Tab EC; Take 1 tablet (40 mg total) by mouth every morning before breakfast.    Gastritis without bleeding, unspecified chronicity, unspecified gastritis type  -     pantoprazole 40 MG Oral Tab EC; Take 1 tablet (40 mg total) by mouth every morning before breakfast.    ILD (interstitial lung disease) (HCC)  -     pantoprazole 40 MG Oral Tab EC; Take 1 tablet (40 mg total) by mouth every morning before breakfast.  -     Complete PFT; Future    CARVAJAL (dyspnea on exertion)  -     CARD ECHO 2D DOPPLER (CPT=93306); Future    Immunodeficiency due to drug therapy (HCC)    Dyspnea on exertion    Seronegative rheumatoid arthritis (HCC)    Cervical radiculopathy              Return in about 4 months (around 1/13/2025).      The above plan of care, diagnosis, orders, and follow-up were discussed with the patient. Questions related to this recommended plan of care were answered.    Thank you for referring this delightful patient to me. Please feel free to contact me with any questions.     This report was performed utilizing speech recognition software technology. Despite proofreading, speech recognition errors could escape detection. If a word or phrase is confusing or out of context, please do not hesitate to call for   clarification.       Kind regards      Judd Douglas MD  [facility]

## 2024-10-11 LAB
ABSOLUTE BASOPHILS: 64 CELLS/UL (ref 0–200)
ABSOLUTE EOSINOPHILS: 92 CELLS/UL (ref 15–500)
ABSOLUTE LYMPHOCYTES: 1996 CELLS/UL (ref 850–3900)
ABSOLUTE MONOCYTES: 1159 CELLS/UL (ref 200–950)
ABSOLUTE NEUTROPHILS: 5888 CELLS/UL (ref 1500–7800)
ALBUMIN/GLOBULIN RATIO: 1.4 (CALC) (ref 1–2.5)
ALBUMIN: 4.2 G/DL (ref 3.6–5.1)
ALKALINE PHOSPHATASE: 72 U/L (ref 36–130)
ALT: 13 U/L (ref 9–46)
AST: 20 U/L (ref 10–40)
BASOPHILS: 0.7 %
BILIRUBIN, TOTAL: 0.3 MG/DL (ref 0.2–1.2)
BUN: 10 MG/DL (ref 7–25)
CALCIUM: 10 MG/DL (ref 8.6–10.3)
CARBON DIOXIDE: 27 MMOL/L (ref 20–32)
CHLORIDE: 105 MMOL/L (ref 98–110)
CREATININE: 1.03 MG/DL (ref 0.6–1.29)
EGFR: 94 ML/MIN/1.73M2
EOSINOPHILS: 1 %
GLOBULIN: 3.1 G/DL (CALC) (ref 1.9–3.7)
GLUCOSE: 87 MG/DL (ref 65–99)
HEMATOCRIT: 43.7 % (ref 38.5–50)
HEMOGLOBIN: 14 G/DL (ref 13.2–17.1)
LYMPHOCYTES: 21.7 %
MCH: 29.3 PG (ref 27–33)
MCHC: 32 G/DL (ref 32–36)
MCV: 91.4 FL (ref 80–100)
MONOCYTES: 12.6 %
MPV: 8.8 FL (ref 7.5–12.5)
NEUTROPHILS: 64 %
PLATELET COUNT: 353 THOUSAND/UL (ref 140–400)
POTASSIUM: 4.4 MMOL/L (ref 3.5–5.3)
PROTEIN, TOTAL: 7.3 G/DL (ref 6.1–8.1)
RDW: 13.4 % (ref 11–15)
RED BLOOD CELL COUNT: 4.78 MILLION/UL (ref 4.2–5.8)
SODIUM: 142 MMOL/L (ref 135–146)
WHITE BLOOD CELL COUNT: 9.2 THOUSAND/UL (ref 3.8–10.8)

## 2024-11-12 DIAGNOSIS — I10 PRIMARY HYPERTENSION: ICD-10-CM

## 2024-11-12 DIAGNOSIS — Z51.81 THERAPEUTIC DRUG MONITORING: ICD-10-CM

## 2024-11-12 DIAGNOSIS — K22.9 ESOPHAGEAL ABNORMALITY: ICD-10-CM

## 2024-11-12 DIAGNOSIS — M34.9 SCLERODERMA (HCC): ICD-10-CM

## 2024-11-12 DIAGNOSIS — M06.00 SERONEGATIVE RHEUMATOID ARTHRITIS (HCC): Primary | ICD-10-CM

## 2024-11-12 DIAGNOSIS — I73.00 RAYNAUD'S DISEASE WITHOUT GANGRENE: ICD-10-CM

## 2024-11-12 DIAGNOSIS — I73.00 RAYNAUD'S DISEASE WITHOUT GANGRENE: Primary | ICD-10-CM

## 2024-11-12 RX ORDER — AMLODIPINE BESYLATE 10 MG/1
10 TABLET ORAL DAILY
Qty: 90 TABLET | Refills: 3 | Status: SHIPPED | OUTPATIENT
Start: 2024-11-12

## 2024-11-12 RX ORDER — SILDENAFIL CITRATE 20 MG/1
20 TABLET ORAL 3 TIMES DAILY
Qty: 270 TABLET | Refills: 3 | Status: SHIPPED | OUTPATIENT
Start: 2024-11-12

## 2024-11-12 NOTE — TELEPHONE ENCOUNTER
Last office visit: 9/11/24    Next Rheum Apt:1/13/2025 Judd Douglas MD    Last fill: 9/11/24    Labs:   Lab Results   Component Value Date    CREATSERUM 1.03 10/10/2024    ALKPHO 72 10/10/2024    AST 20 10/10/2024    ALT 13 10/10/2024    BILT 0.3 10/10/2024    TP 7.3 10/10/2024    ALB 4.2 10/10/2024       Lab Results   Component Value Date    WBC 9.2 10/10/2024    HGB 14.0 10/10/2024     10/10/2024    NEPERCENT 64 10/10/2024    LYPERCENT 21.7 10/10/2024    NE 5,888 10/10/2024    LYMABS 1,996 10/10/2024

## 2024-11-12 NOTE — TELEPHONE ENCOUNTER
Last office visit: 9/11/2024    Next Rheum Apt:1/13/2025 Judd Douglas MD    Last fill: 8/18/2024  90 tabs     Labs:   Lab Results   Component Value Date    CREATSERUM 1.03 10/10/2024    ALKPHO 72 10/10/2024    AST 20 10/10/2024    ALT 13 10/10/2024    BILT 0.3 10/10/2024    TP 7.3 10/10/2024    ALB 4.2 10/10/2024       Lab Results   Component Value Date    WBC 9.2 10/10/2024    HGB 14.0 10/10/2024     10/10/2024    NEPERCENT 64 10/10/2024    LYPERCENT 21.7 10/10/2024    NE 5,888 10/10/2024    LYMABS 1,996 10/10/2024

## 2024-11-19 DIAGNOSIS — M34.9 SCLERODERMA (HCC): Primary | ICD-10-CM

## 2024-11-19 DIAGNOSIS — M06.00 SERONEGATIVE RHEUMATOID ARTHRITIS (HCC): ICD-10-CM

## 2024-11-19 DIAGNOSIS — K29.70 GASTRITIS WITHOUT BLEEDING, UNSPECIFIED CHRONICITY, UNSPECIFIED GASTRITIS TYPE: ICD-10-CM

## 2024-11-19 RX ORDER — LEFLUNOMIDE 20 MG/1
20 TABLET ORAL DAILY
Qty: 90 TABLET | Refills: 0 | Status: SHIPPED | OUTPATIENT
Start: 2024-11-19

## 2024-11-19 NOTE — TELEPHONE ENCOUNTER
Last office visit: 9/11/24    Next Rheum Apt:1/13/2025 Judd Douglas MD    Last fill: 8/26/24    Labs:   Lab Results   Component Value Date    CREATSERUM 1.03 10/10/2024    ALKPHO 72 10/10/2024    AST 20 10/10/2024    ALT 13 10/10/2024    BILT 0.3 10/10/2024    TP 7.3 10/10/2024    ALB 4.2 10/10/2024       Lab Results   Component Value Date    WBC 9.2 10/10/2024    HGB 14.0 10/10/2024     10/10/2024    NEPERCENT 64 10/10/2024    LYPERCENT 21.7 10/10/2024    NE 5,888 10/10/2024    LYMABS 1,996 10/10/2024

## 2024-11-20 ENCOUNTER — PATIENT MESSAGE (OUTPATIENT)
Dept: RHEUMATOLOGY | Facility: CLINIC | Age: 39
End: 2024-11-20

## 2024-11-20 DIAGNOSIS — I73.00 RAYNAUD'S DISEASE WITHOUT GANGRENE: ICD-10-CM

## 2024-11-20 DIAGNOSIS — M34.9 SCLERODERMA (HCC): ICD-10-CM

## 2024-11-20 DIAGNOSIS — K22.9 ESOPHAGEAL ABNORMALITY: ICD-10-CM

## 2024-11-20 DIAGNOSIS — Z51.81 THERAPEUTIC DRUG MONITORING: ICD-10-CM

## 2024-11-20 DIAGNOSIS — M06.00 SERONEGATIVE RHEUMATOID ARTHRITIS (HCC): ICD-10-CM

## 2024-11-20 DIAGNOSIS — K29.70 GASTRITIS WITHOUT BLEEDING, UNSPECIFIED CHRONICITY, UNSPECIFIED GASTRITIS TYPE: ICD-10-CM

## 2024-11-20 DIAGNOSIS — M34.9 SCLERODERMA (HCC): Primary | ICD-10-CM

## 2024-11-20 RX ORDER — SILDENAFIL CITRATE 20 MG/1
20 TABLET ORAL 3 TIMES DAILY
Qty: 270 TABLET | Refills: 3 | Status: SHIPPED | OUTPATIENT
Start: 2024-11-20

## 2024-11-20 RX ORDER — MYCOPHENOLIC ACID 360 MG/1
TABLET, DELAYED RELEASE ORAL
Qty: 540 TABLET | Refills: 0 | Status: SHIPPED | OUTPATIENT
Start: 2024-11-20

## 2024-11-20 NOTE — TELEPHONE ENCOUNTER
Last office visit: 9/11/24    Next Rheum Apt:1/13/2025 Judd Douglas MD    Last fill: 8/19/24    Labs:   Lab Results   Component Value Date    CREATSERUM 1.03 10/10/2024    ALKPHO 72 10/10/2024    AST 20 10/10/2024    ALT 13 10/10/2024    BILT 0.3 10/10/2024    TP 7.3 10/10/2024    ALB 4.2 10/10/2024       Lab Results   Component Value Date    WBC 9.2 10/10/2024    HGB 14.0 10/10/2024     10/10/2024    NEPERCENT 64 10/10/2024    LYPERCENT 21.7 10/10/2024    NE 5,888 10/10/2024    LYMABS 1,996 10/10/2024

## 2024-11-20 NOTE — TELEPHONE ENCOUNTER
Patient would like his Sildenafil script sent to [facility] of [facility]. Medication pended below for your approval.       Last office visit: 09/11/2024    Next Rheum Apt:1/13/2025 Judd Douglas MD    Last fill: 11/12/2024    QTY: 270     Refills: 3    Labs:   Lab Results   Component Value Date    CREATSERUM 1.03 10/10/2024    ALKPHO 72 10/10/2024    AST 20 10/10/2024    ALT 13 10/10/2024    BILT 0.3 10/10/2024    TP 7.3 10/10/2024    ALB 4.2 10/10/2024       Lab Results   Component Value Date    WBC 9.2 10/10/2024    HGB 14.0 10/10/2024     10/10/2024    NEPERCENT 64 10/10/2024    LYPERCENT 21.7 10/10/2024    NE 5,888 10/10/2024    LYMABS 1,996 10/10/2024

## 2024-11-22 DIAGNOSIS — M06.00 SERONEGATIVE RHEUMATOID ARTHRITIS (HCC): ICD-10-CM

## 2024-11-22 DIAGNOSIS — K29.70 GASTRITIS WITHOUT BLEEDING, UNSPECIFIED CHRONICITY, UNSPECIFIED GASTRITIS TYPE: ICD-10-CM

## 2024-11-22 DIAGNOSIS — M34.9 SCLERODERMA (HCC): ICD-10-CM

## 2024-11-22 RX ORDER — LEFLUNOMIDE 20 MG/1
20 TABLET ORAL DAILY
Qty: 90 TABLET | Refills: 0 | OUTPATIENT
Start: 2024-11-22

## 2024-11-28 DIAGNOSIS — K29.70 GASTRITIS WITHOUT BLEEDING, UNSPECIFIED CHRONICITY, UNSPECIFIED GASTRITIS TYPE: ICD-10-CM

## 2024-11-28 DIAGNOSIS — M34.9 SCLERODERMA (HCC): ICD-10-CM

## 2024-11-28 DIAGNOSIS — M06.00 SERONEGATIVE RHEUMATOID ARTHRITIS (HCC): ICD-10-CM

## 2024-11-29 NOTE — TELEPHONE ENCOUNTER
Future Appointments   Date Time Provider Department Center   1/13/2025 10:45 AM Judd Douglas MD [facility]     Last office visit: 9/11/2024    Last fill: 11/28/2023 180 tab, 1 refill

## 2024-12-01 RX ORDER — HYDROXYCHLOROQUINE SULFATE 200 MG/1
TABLET, FILM COATED ORAL
Qty: 180 TABLET | Refills: 1 | Status: SHIPPED | OUTPATIENT
Start: 2024-12-01

## 2025-01-22 ENCOUNTER — OFFICE VISIT (OUTPATIENT)
Dept: RHEUMATOLOGY | Facility: CLINIC | Age: 40
End: 2025-01-22
Payer: MEDICAID

## 2025-01-22 VITALS
WEIGHT: 220.81 LBS | SYSTOLIC BLOOD PRESSURE: 102 MMHG | HEART RATE: 90 BPM | RESPIRATION RATE: 16 BRPM | HEIGHT: 76 IN | OXYGEN SATURATION: 97 % | DIASTOLIC BLOOD PRESSURE: 80 MMHG | BODY MASS INDEX: 26.89 KG/M2 | TEMPERATURE: 99 F

## 2025-01-22 DIAGNOSIS — J84.9 ILD (INTERSTITIAL LUNG DISEASE) (HCC): ICD-10-CM

## 2025-01-22 DIAGNOSIS — L98.499 ULCER OF FINGER, UNSPECIFIED ULCER STAGE (HCC): ICD-10-CM

## 2025-01-22 DIAGNOSIS — M34.9 SCLERODERMA (HCC): Primary | ICD-10-CM

## 2025-01-22 DIAGNOSIS — R06.09 DOE (DYSPNEA ON EXERTION): ICD-10-CM

## 2025-01-22 DIAGNOSIS — D84.821 IMMUNODEFICIENCY DUE TO DRUG THERAPY (HCC): ICD-10-CM

## 2025-01-22 DIAGNOSIS — I73.00 RAYNAUD'S DISEASE WITHOUT GANGRENE: ICD-10-CM

## 2025-01-22 DIAGNOSIS — L98.499: ICD-10-CM

## 2025-01-22 DIAGNOSIS — Z51.81 THERAPEUTIC DRUG MONITORING: ICD-10-CM

## 2025-01-22 DIAGNOSIS — K21.9 GASTROESOPHAGEAL REFLUX DISEASE, UNSPECIFIED WHETHER ESOPHAGITIS PRESENT: ICD-10-CM

## 2025-01-22 DIAGNOSIS — E83.59 CALCINOSIS: ICD-10-CM

## 2025-01-22 DIAGNOSIS — Z79.899 IMMUNODEFICIENCY DUE TO DRUG THERAPY (HCC): ICD-10-CM

## 2025-01-22 DIAGNOSIS — G62.9 NEUROPATHY: ICD-10-CM

## 2025-01-22 PROCEDURE — 99215 OFFICE O/P EST HI 40 MIN: CPT | Performed by: INTERNAL MEDICINE

## 2025-01-22 RX ORDER — MUPIROCIN CALCIUM 20 MG/G
1 CREAM TOPICAL 2 TIMES DAILY
Qty: 15 G | Refills: 1 | Status: SHIPPED | OUTPATIENT
Start: 2025-01-22 | End: 2025-04-22

## 2025-01-22 NOTE — PROGRESS NOTES
Rheumatology f/u Patient Note  ====================================================================================================  Chief complaint: Scleroderma  ?  PCP  JILLIAN BUTCHER II    =====================================================================================================  HPI 2/2022    Stefan Johnson JrArabella is a 39 year old male   Prior Pulmonary (Dr. Johnson)   Prior Rheum (Dr. Amor, [facility])  Patient here to establish care for scleroderma.  Initially was diagnosed with diffuse scleroderma in January 2020 at [facility].  He was found to have Raynaud's (since end 2018), sclerodactyly, diffuse skin thickening, puffy fingers, GERD, and digital ulcers.  Serologies were consistent with a positive OPAL at 1: 320 and RNA polymerase III and he was started on mycophenolate for skin thickening.  In 2021: He had an echocardiogram which showed an EF of 41% and no TR.  He had PFTs that showed a restrictive defect but normal DLCO.  Immunosuppression:  MMF 1500mg PO BID (started 01/2020, increased to 1500mg in 06/2020, ran out 09/2020-03/2021 because insurance lapse). He has restarted CellCept just 3 days ago after seeing his PCP.  Previously worked as a   Still smoking 2 cigarillos each day.. Previously on chantix, this did not help  EtOH: 2 beers/day.  Used to smoke cannabis.  Stopped  CARVAJAL after moderate activity.   Daughters: 17, 14, 6.   Previously a .   Diarrhea on cellcept.   GERD- bad, used to be on protonix  Raynauds- active  Constipation- none  Skin thickening-yes  Oral ulcers- no  Digital ulcers- in the past, last in 2020  Kidney problems-no. Takes BP  Joint pains/swelling-no  Medications:  MMF 1 gm BID  Losartan 100 mg/HCTZ 12.5 mg  ==============================================================================================================  Visit: 09/11/24  Nuclear stress test was negative. CARVAJAL with cutting the grass.  Feels like food gets stuck in his throat/chest.  Often eats  dinner then falls asleep within 30 minutes.  Obtain Protonix from [facility] which is cheaper.  Cannot increase to twice daily dosing.  Having some financial issues.  -Did start sildenafil 20 mg 3 times daily which did help with digital ulcers.  Ran out of this.  -cigarillo: 1 per day, but only 4 puffs.  GERD- cannot get protonix, insurance will not cover.  This is from [facility].  Active.  Raynauds- still active from time to time but better with amlodipine.  Better in the summer.  Takes sildenafil as needed.  Constipation- no  Skin thickening-stable  Oral ulcers- no  Digital ulcers- yes as above, improving  Kidney problems-no  Joint pains/swelling-stable  5 point ROS negative except noted above  ==============================================================================================================  Today's Visit: 01/22/25    Doing fairly well overall.  Still with calcinosis/healing digital ulcer overlying the dorsum of the right PIP 5.  Left PIP 5 ulcer is healing.  Smoking but is decreased to 3 cigarillos, 5 puffs each.  -Does have some unusual sensation in the feet particularly with sitting.  Not necessarily painful sensation.  When standing and walking that issue resolves.  -continues on myfortic 1080 BID and leflunomide 20 mg daily    GERD-improved with Protonix.  This is from [facility].  Raynauds-good on amlodipine and sildenafil and losartan  constipation- no  Skin thickening-skin thickening improving overall but left side of face appetite  Oral ulcers- no  Digital ulcers- yes as above, improving  Kidney problems-no  Joint pains/swelling-stable    No new or worsening dyspnea on exertion symptoms or shortness of breath      5 point ROS negative except noted above  I had reviewed past medical and family histories together with allergy and medication lists documented.      Medications:  Outpatient Medications Marked as Taking for the 1/22/25 encounter (Office Visit) with Misael  MD Judd   Medication Sig Dispense Refill    mupirocin 2 % External Cream Apply 1 Application topically 2 (two) times daily. 15 g 1    hydroxychloroquine 200 MG Oral Tab TAKE 1 TABLET BY MOUTH TWICE DAILY 180 tablet 1    mycophenolate sodium 360 MG Oral Tab EC TAKE 3 TABLETS BY MOUTH TWICE DAILY BEFORE MEALS 540 tablet 0    sildenafil 20 MG Oral Tab Take 1 tablet (20 mg total) by mouth 3 (three) times daily. 270 tablet 3    LEFLUNOMIDE 20 MG Oral Tab TAKE 1 TABLET(20 MG) BY MOUTH DAILY 90 tablet 0    AMLODIPINE 10 MG Oral Tab TAKE 1 TABLET(10 MG) BY MOUTH DAILY 90 tablet 3    metoprolol succinate  MG Oral Tablet 24 Hr Take 1 tablet (100 mg total) by mouth daily.      pantoprazole 40 MG Oral Tab EC Take 1 tablet (40 mg total) by mouth every morning before breakfast. 90 tablet 3    losartan 100 MG Oral Tab Take 1 tablet (100 mg total) by mouth daily. 90 tablet 1     Modified Medications    No medications on file     Medications Discontinued During This Encounter   Medication Reason    Losartan Potassium-HCTZ 100-12.5 MG Oral Tab     metoprolol tartrate 25 MG Oral Tab          ?  Allergies:  No Known Allergies      Objective    Vitals:    01/22/25 1003   BP: 102/80   Pulse: 90   Resp: 16   Temp: 99 °F (37.2 °C)   SpO2: 97%   Weight: 220 lb 12.8 oz (100.2 kg)   Height: 6' 4\" (1.93 m)     GEN: NAD, well-nourished.   HEENT: Head: NCAT. Face: No lesions. Eyes: Conjunctiva clear. Sclera are anicteric. PERRLA. EOMs are full.   -4 fingerbredth oral aperture    CV: RRR, no mrg, S1/S2  PULM:  CTAB, easy effort  Extremities: No cyanosis, edema or deformities.   Neurologic: Strength, CN2-12 grossly intact   Psych: normal affect.   Skin:   1+ skin thickening overlying anterior chest, in bilateral forearms between MCPs/PIPs and PIPs/DIPs.  Otherwise skin throughout is soft    MSK: 28 joint count performed. No evidence of synovitis in mcp, pip, dip, wrist, elbows, shoulders, hips, knees, ankles, mtp unless otherwise noted.  Full ROM of elbows, wrists, knees.     2 TJ (LMCP2,3)    Calcinosis cutis noted overlying dorsum of right PIP 5.  Healing dorsal PIP 5 ulceration on the left hand.  Hammertoes noted bilaterally, some denudation of the PIPs.        Labs:    10/10/2024  Creatinine 1.03, rest of CMP WNL  CBC W differential WNL    7/2024  A1c 5.3  Vitamin D 17  B12/folate WNL  Ferritin 84  CBC W differential WNL  Creatinine 1.04, rest of CMP WNL  SPEP/LINUS WNL  CK4 52  Mag 2.1  Sed rate 17  CRP 6.1 mg/dL  UPCR 46/188  U/A WNL    4/4/2024  Creatinine 1.15, rest of CMP WNL  TSH WNL  CBC W differential WNL      9/2023  UPCR 45/286  CMP, CBC W differential WNL    Additional Labs:    Lab Results   Component Value Date    WBC 9.2 10/10/2024    RBC 4.78 10/10/2024    HGB 14.0 10/10/2024    HCT 43.7 10/10/2024     10/10/2024    MCV 91.4 10/10/2024    MCH 29.3 10/10/2024    MCHC 32.0 10/10/2024    RDW 13.4 10/10/2024    NEPERCENT 64 10/10/2024    LYPERCENT 21.7 10/10/2024    MOPERCENT 12.6 10/10/2024    EOPERCENT 1.0 10/10/2024    BAPERCENT 0.7 10/10/2024    NE 5,888 10/10/2024    LYMABS 1,996 10/10/2024    MOABSO 1,159 (H) 10/10/2024    EOABSO 92 10/10/2024    BAABSO 64 10/10/2024     Lab Results   Component Value Date    GLU 87 10/10/2024    BUN 10 10/10/2024    BUNCREA SEE NOTE: 10/10/2024    CREATSERUM 1.03 10/10/2024    GFRNAA 83 04/08/2022    GFRAA 97 04/08/2022    CA 10.0 10/10/2024    ALKPHO 72 10/10/2024    AST 20 10/10/2024    ALT 13 10/10/2024    BILT 0.3 10/10/2024    TP 7.3 10/10/2024    ALB 4.2 10/10/2024    GLOBULIN 3.1 10/10/2024    AGRATIO 1.4 10/10/2024     10/10/2024    K 4.4 10/10/2024     10/10/2024    CO2 27 10/10/2024         2/2023  CK3 54  UPCR 12/190  U/A WNL  CRP 2.1 mg/L  Sed rate 28  Creatinine 1.27, rest of CMP WNL  CBC with differential WNL    4/2022  comprehensive metabolic panel (CMP) and complete blood count with differential (CBC w/ diff) wnl    2/2022 Labs:     Sed rate 6  CRP  1.1 mg/L  Urine protein 24  Urine creatinine 162  U/a with trace protein, no RBCs or WBCs  WBC 8.2, hemoglobin 15.9, platelets 319  Creatinine 1.29, calcium 11.0, albumin 5.0, rest of CMP normal    ILD Serologies: RNA natalie III elevated, OPAL 1:320 (speckled). Scl-70, SSA, SSB, Sm, RNP negative. Myomarker negative.        4/2021  EKG wnl    4/2021 TTE: [facility]           PFT (02/14/2020, at [facility] and scanned to media tab): Moderate restrictive ventilatory defect with normal diffusing capacity  FVC 4.20/68 -> 4.02/66 (-4% post-BD)  FEV1 3.43/74 -> 3.39/74 (-1% post-BD)  Ratio 82 -> 84  TLC 5.27/62  FRC 2.86/65  RV 1.08/46  DLCO 30.5/89      (ABNORMAL) Complete PFT (04/19/2022 1:18 PM CDT)  (ABNORMAL) Complete PFT (04/19/2022 1:18 PM CDT)   Component Value Ref Range Test Method Analysis Time Performed At [facility]   FVC_PRE 4.29 (L) 4.86 - 7.30 L     [facility]     FEV1_PRE 3.46 (L) 3.64 - 5.45 L     [facility]     FEV1/FVC_PRE 80.61 61.65 - 87.28 %     [facility]     DLCO_SB_PRE 29.06 27.32 - 43.90 ml/(min*mmHg)     [facility]      8/2023 PFTs  Findings:  FEV1 is 3.48L, 80% predicted.  FVC is 4.12L, 76% predicted.  FEV1/ FVC ratio is 0.85.  The flow-volume loop demonstrates a normal pattern.  The TLC is 6.11L, 73% predicted.  The residual volume 1.98L, 93% predicted.  The diffusion capacity is 63% predicted and 102% predicted when corrected for alveolar volume.     Radiology:    3/2022 HRCT: wnl  3/4/2022: TTE: wnl. Grade 1 diastolic dysfunction.   8/2023: TTE: Doppler parameters are consistent with abnormal left      ventricular relaxation - grade 1 diastolic dysfunction. PASP: 29.       2/16/2024  CT chest, small pieripheral bleds, no ILD noted. Diverticulsosis without diverticulitis noted.  Radiology review:      =====================================================================================================  Assessment and Plan  Assessment:  1. Scleroderma (HCC)    2. Therapeutic drug monitoring    3.  Ulcer of finger, unspecified ulcer stage ([facility])    4. ILD (interstitial lung disease) ([facility])    5. CARVAJAL (dyspnea on exertion)    6. Skin ulcer of hand, unspecified ulcer stage ([facility])    7. Neuropathy    8. Immunodeficiency due to drug therapy ([facility])    9. Raynaud's disease without gangrene    10. Gastroesophageal reflux disease, unspecified whether esophagitis present    11. 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  Previously followed by rheumatology and pulmonary at [facility].  Clinical Manifestations: Raynaud's (since late 2018), sclerodactyly, diffuse skin thickening, puffy fingers, GERD, and digital ulcers, cardiomyopathy of unclear etiology (EF 41% in 2021 [normal EF in 2023], no TR noted on TTE), restrictive defect on PFTs (no ILD on HRCT in 2022 or CT chest from 2/2024).   Serologies/Laboratory findings:  OPAL at 1:320 and RNA polymerase III   Medications:  MMF 1500mg PO BID (started 01/2020, increased to 1500mg in 06/2020, ran out since 09/2020-03/2021 because insurance lapse), then was out of medication for another several months. He has restarted CellCept 1gm BID 2/2022 but had issues with diarrhea. Now on myfortic 1080 mg BID (since 2/2022) which he is tolerating.     #Sclerodactyly: Stable  -Flexion contracture stable in the hands and feet  -It appears that flexion contracture of the feet is causing friction overlying the dorsum of the toes with shoes    #cervical radiculopathy: Suspect.  Perhaps postural given new pillow was purchased.    #Digital ulcers:  -R worse than left Dorsal PIP 5 digital ulcers improving with time with amlodipine + sildenafil  -Component of calcinosis overlying right PIP 5    #Raynaud's:   -Good with amlodipine + losartan +prn sildanefil.     #Seronegative rheumatoid arthritis: Mildly active today with 2 tender joints but no swollen joints    #GERD:   -Controlled with daily PPI    #Dyspnea on exertion: primarily  with cutting grass. TTE/PFT/nuclear stress test (from 8/2024) all stable or normal.  He does have a grass allergy.  Perhaps he has had more dyspnea with environmental allergy exposure.  -continues to f/u with pulmonary    High risk medication labs including CMP and CBC w/ diff reviewed from  10/2024  Results are stable. Ophth: Andres Whitehead: exam from 2023 wnl.  Hepatitis B surface antigen, HCV negative in 2020    Plan:  -previously prescribed. Occupational therapy to work on sclerodactyly and to improve finger ROM; readdress at next RTC  -get blood work every 3 months for monitoring while on leflunomide and Myfortic.   -continue amlodipine 10 mg daily and losartan 100 mg daily and sildenafil 20 mg 3 times daily for Raynauds and digital ulcers  -Trial mupirocin for healing digital ulcers/calcinosis, refer to derm to evaluate palmar lesion of the right hand  -continue leflunomide 20 mg daily  -cont hydroxychloroquine (plaquenil) 200 mg twice daily for inflammatory arthritis; need to readdress ophthalmology screening at the next visit  -continue myfortic (mycophenolic sodium) 1080 mg (3 pills) in the morning and  1080 mg (3 pills) in the evening.      -If sclerodactyly/soft tissue or thickening overlying the face worsens, consider adding rituximab therapy in the future.    Need to tie shoes looser to prevent friction overlying dorsum of bilateral toes.  Use Vaseline to prevent friction.    Continue every 3-month Myfortic/leflunomide monitoring blood work.    -continue pantoprazole for Ssc GERD;   ---continue sending Rx to [facility] given insurance issues.    -Previously gave phone number to smoking cessation clinic.  Quitting smoking will help with Raynaud's and digital ulcers.    -Recent PFTs in 2023 are stable; HRCT from 2/2024 without any ILD; update PFT now  -Recent TTE in 2023 with evidence of diastolic dysfunction, patient previously reported he was to see cardiology in the past.  Did not end up  following with cardiology.  Update TTE now    EMG/NCS of the BLE to evaluate for potential neuropathy of the bilateral feet.  No localized arthralgia noted but patient notes unusual sensation in the feet with certain positions.  Query radiculopathy    -Discussed previously, patient defers vaccinations for flu, COVID, pneumococcal vaccine.    -rtc 4 months      Total time spent by me on DOS was 40 min  This includes:   Preparing to see the patient (review of tests, prior medical visits)  Obtaining and/or reviewing separately medically appropriate obtained history  Performing the medically appropriate exam and/or evaluation  Clinical documentation in the EHR or other health record  Counseling and educating the patient or caregiver  Interpreting results and/or communicating results to the patient/family/caregiver  Ordering medications, tests, or procedures  Documentation in EHR      Diagnoses and all orders for this visit:    Scleroderma ([facility])  -     Comp Metabolic Panel (14); Future  -     CBC With Differential With Platelet; Future  -     Comp Metabolic Panel (14)  -     CBC With Differential With Platelet  -     mupirocin 2 % External Cream; Apply 1 Application topically 2 (two) times daily.  -     Complete PFT; Future  -     CARD ECHO 2D DOPPLER (CPT=93306); Future  -     Derm Referral - External    Therapeutic drug monitoring  -     Comp Metabolic Panel (14); Future  -     CBC With Differential With Platelet; Future  -     Comp Metabolic Panel (14)  -     CBC With Differential With Platelet  -     mupirocin 2 % External Cream; Apply 1 Application topically 2 (two) times daily.    Ulcer of finger, unspecified ulcer stage ([facility])  -     mupirocin 2 % External Cream; Apply 1 Application topically 2 (two) times daily.    ILD (interstitial lung disease) ([facility])  -     Complete PFT; Future  -     CARD ECHO 2D DOPPLER (CPT=93306); Future    CARVAJAL (dyspnea on exertion)  -     CARD ECHO 2D DOPPLER (CPT=93306); Future    Skin  ulcer of hand, unspecified ulcer stage (HCC)  -     Derm Referral - External    Neuropathy  -     EMG/NCV; Future    Immunodeficiency due to drug therapy (HCC)    Raynaud's disease without gangrene    Gastroesophageal reflux disease, unspecified whether esophagitis present    Calcinosis                Return in about 4 months (around 5/22/2025).      The above plan of care, diagnosis, orders, and follow-up were discussed with the patient. Questions related to this recommended plan of care were answered.    Thank you for referring this delightful patient to me. Please feel free to contact me with any questions.     This report was performed utilizing speech recognition software technology. Despite proofreading, speech recognition errors could escape detection. If a word or phrase is confusing or out of context, please do not hesitate to call for   clarification.       Kind regards      Judd Douglas MD  [facility]

## 2025-02-01 LAB
ABSOLUTE BASOPHILS: 41 CELLS/UL (ref 0–200)
ABSOLUTE EOSINOPHILS: 57 CELLS/UL (ref 15–500)
ABSOLUTE LYMPHOCYTES: 1847 CELLS/UL (ref 850–3900)
ABSOLUTE MONOCYTES: 1142 CELLS/UL (ref 200–950)
ABSOLUTE NEUTROPHILS: 5014 CELLS/UL (ref 1500–7800)
ALBUMIN/GLOBULIN RATIO: 1.6 (CALC) (ref 1–2.5)
ALBUMIN: 4.5 G/DL (ref 3.6–5.1)
ALKALINE PHOSPHATASE: 85 U/L (ref 36–130)
ALT: 15 U/L (ref 9–46)
AST: 24 U/L (ref 10–40)
BASOPHILS: 0.5 %
BILIRUBIN, TOTAL: 0.7 MG/DL (ref 0.2–1.2)
BUN: 13 MG/DL (ref 7–25)
CALCIUM: 9.9 MG/DL (ref 8.6–10.3)
CARBON DIOXIDE: 27 MMOL/L (ref 20–32)
CHLORIDE: 99 MMOL/L (ref 98–110)
CREATININE: 1.12 MG/DL (ref 0.6–1.29)
EGFR: 85 ML/MIN/1.73M2
EOSINOPHILS: 0.7 %
GLOBULIN: 2.8 G/DL (CALC) (ref 1.9–3.7)
GLUCOSE: 96 MG/DL (ref 65–99)
HEMATOCRIT: 43.3 % (ref 38.5–50)
HEMOGLOBIN: 13.7 G/DL (ref 13.2–17.1)
LYMPHOCYTES: 22.8 %
MCH: 28.2 PG (ref 27–33)
MCHC: 31.6 G/DL (ref 32–36)
MCV: 89.1 FL (ref 80–100)
MONOCYTES: 14.1 %
MPV: 9.1 FL (ref 7.5–12.5)
NEUTROPHILS: 61.9 %
PLATELET COUNT: 322 THOUSAND/UL (ref 140–400)
POTASSIUM: 3.8 MMOL/L (ref 3.5–5.3)
PROTEIN, TOTAL: 7.3 G/DL (ref 6.1–8.1)
RDW: 13.5 % (ref 11–15)
RED BLOOD CELL COUNT: 4.86 MILLION/UL (ref 4.2–5.8)
SODIUM: 137 MMOL/L (ref 135–146)
WHITE BLOOD CELL COUNT: 8.1 THOUSAND/UL (ref 3.8–10.8)

## 2025-02-12 ENCOUNTER — TELEPHONE (OUTPATIENT)
Facility: CLINIC | Age: 40
End: 2025-02-12

## 2025-02-12 ENCOUNTER — HOSPITAL ENCOUNTER (OUTPATIENT)
Dept: CV DIAGNOSTICS | Facility: HOSPITAL | Age: 40
Discharge: HOME | End: 2025-02-12
Attending: INTERNAL MEDICINE
Payer: MEDICAID

## 2025-02-12 ENCOUNTER — APPOINTMENT (OUTPATIENT)
Dept: RESPIRATORY THERAPY | Facility: HOSPITAL | Age: 40
End: 2025-02-12
Attending: INTERNAL MEDICINE
Payer: MEDICAID

## 2025-02-12 DIAGNOSIS — I42.9 CARDIOMYOPATHY, UNSPECIFIED TYPE (HCC): Primary | ICD-10-CM

## 2025-02-12 DIAGNOSIS — R06.09 DOE (DYSPNEA ON EXERTION): ICD-10-CM

## 2025-02-12 DIAGNOSIS — J84.9 ILD (INTERSTITIAL LUNG DISEASE) (HCC): ICD-10-CM

## 2025-02-12 DIAGNOSIS — M34.9 SCLERODERMA (HCC): ICD-10-CM

## 2025-02-12 PROCEDURE — 93306 TTE W/DOPPLER COMPLETE: CPT | Performed by: INTERNAL MEDICINE

## 2025-02-14 NOTE — TELEPHONE ENCOUNTER
I spoke with patient and he informed me that he has an appt on Monday, 2/17/25 at [facility].

## 2025-02-19 PROBLEM — J84.9 ILD (INTERSTITIAL LUNG DISEASE) (HCC): Status: ACTIVE | Noted: 2025-02-19

## 2025-02-19 PROCEDURE — 94729 DIFFUSING CAPACITY: CPT | Performed by: INTERNAL MEDICINE

## 2025-02-19 PROCEDURE — 94726 PLETHYSMOGRAPHY LUNG VOLUMES: CPT | Performed by: INTERNAL MEDICINE

## 2025-02-19 PROCEDURE — 94010 BREATHING CAPACITY TEST: CPT | Performed by: INTERNAL MEDICINE

## 2025-02-19 NOTE — PROCEDURES
Pulmonary Function Test Report  Findings:  Prebronchodilator FEV1 is 3.67L, z-score -1.73.  Prebronchodilator FVC is 4.32L, z-score -2.12.                           FEV1/ FVC ratio is 85 %, z-score 1.00.   The flow-volume loop demonstrates a normal pattern.  The TLC is 5.84L, z-score -2.83. The residual volume 1.50L, z-score -0.81.   The diffusion capacity is 27.69, z-score -1.84 and 0.73 when corrected for alveolar volume.     Impression:  Mild decrease in FEV1 and FVC without obstruction.  There is moderate z-score (-2.51 to -4.0) restriction.  This can be seen in heart failure, fluid overload states, interstitial lung disease, thoracic spine/chest disorders, obesity as well as other clinical scenarios.  Further clinical correlation required.      Diffusion capacity is mild (z-score -1.65 to -2.5).  DLCO improves to normal when considering alveolar volume. This may represent a normal finding but can be seen in emphysema, interstitial lung disease, pulmonary vascular disease (such as pulmonary hypertension) and anemia. If not already performed, would suggest further evaluation as determined clinically     No significant change from previous PFT on 8/2/2023  Disclaimer: This PFT has been interpreted based on Z-score/LLN/ULN criteria as described in ATS guidelines 2022.   Claire Valdez MD  [facility]monary Medicine  Office: [phone]

## 2025-02-26 DIAGNOSIS — M06.00 SERONEGATIVE RHEUMATOID ARTHRITIS (HCC): ICD-10-CM

## 2025-02-26 DIAGNOSIS — M34.9 SCLERODERMA (HCC): Primary | ICD-10-CM

## 2025-02-26 DIAGNOSIS — K29.70 GASTRITIS WITHOUT BLEEDING, UNSPECIFIED CHRONICITY, UNSPECIFIED GASTRITIS TYPE: ICD-10-CM

## 2025-02-26 RX ORDER — MYCOPHENOLIC ACID 360 MG/1
TABLET, DELAYED RELEASE ORAL
Qty: 540 TABLET | Refills: 0 | Status: SHIPPED | OUTPATIENT
Start: 2025-02-26

## 2025-02-26 RX ORDER — LEFLUNOMIDE 20 MG/1
20 TABLET ORAL DAILY
Qty: 90 TABLET | Refills: 0 | Status: SHIPPED | OUTPATIENT
Start: 2025-02-26

## 2025-02-27 DIAGNOSIS — M06.00 SERONEGATIVE RHEUMATOID ARTHRITIS (HCC): ICD-10-CM

## 2025-02-27 DIAGNOSIS — M34.9 SCLERODERMA (HCC): ICD-10-CM

## 2025-02-27 DIAGNOSIS — K29.70 GASTRITIS WITHOUT BLEEDING, UNSPECIFIED CHRONICITY, UNSPECIFIED GASTRITIS TYPE: ICD-10-CM

## 2025-02-27 RX ORDER — HYDROXYCHLOROQUINE SULFATE 200 MG/1
TABLET, FILM COATED ORAL
Qty: 180 TABLET | Refills: 1 | OUTPATIENT
Start: 2025-02-27

## 2025-02-27 NOTE — TELEPHONE ENCOUNTER
Hydroxychloroquine 200 mg    Future Appointments   Date Time Provider Department Center   5/21/2025  1:15 PM Judd Douglas MD [facility]   6/10/2025 10:30 AM Deya Brown DO [facility]     Last office visit; 1/22/2025    Last fill: 12/1/2024 180 tab, 1 refills    Sending pt my chart message that there are refills at the pharmacy. Refill request declined

## 2025-03-11 LAB
ABSOLUTE BASOPHILS: 61 CELLS/UL (ref 0–200)
ABSOLUTE EOSINOPHILS: 41 CELLS/UL (ref 15–500)
ABSOLUTE LYMPHOCYTES: 1897 CELLS/UL (ref 850–3900)
ABSOLUTE MONOCYTES: 1163 CELLS/UL (ref 200–950)
ABSOLUTE NEUTROPHILS: 7038 CELLS/UL (ref 1500–7800)
ALBUMIN/GLOBULIN RATIO: 1.5 (CALC) (ref 1–2.5)
ALBUMIN: 4.4 G/DL (ref 3.6–5.1)
ALKALINE PHOSPHATASE: 83 U/L (ref 36–130)
ALT: 13 U/L (ref 9–46)
AST: 19 U/L (ref 10–40)
BASOPHILS: 0.6 %
BILIRUBIN, TOTAL: 0.4 MG/DL (ref 0.2–1.2)
BUN: 13 MG/DL (ref 7–25)
CALCIUM: 10.1 MG/DL (ref 8.6–10.3)
CARBON DIOXIDE: 27 MMOL/L (ref 20–32)
CHLORIDE: 101 MMOL/L (ref 98–110)
CREATININE: 1.18 MG/DL (ref 0.6–1.29)
EGFR: 80 ML/MIN/1.73M2
EOSINOPHILS: 0.4 %
GLOBULIN: 3 G/DL (CALC) (ref 1.9–3.7)
GLUCOSE: 100 MG/DL (ref 65–99)
HEMATOCRIT: 45.2 % (ref 38.5–50)
HEMOGLOBIN: 14.4 G/DL (ref 13.2–17.1)
LYMPHOCYTES: 18.6 %
MCH: 28.1 PG (ref 27–33)
MCHC: 31.9 G/DL (ref 32–36)
MCV: 88.1 FL (ref 80–100)
MONOCYTES: 11.4 %
MPV: 8.8 FL (ref 7.5–12.5)
NEUTROPHILS: 69 %
PLATELET COUNT: 420 THOUSAND/UL (ref 140–400)
POTASSIUM: 4.1 MMOL/L (ref 3.5–5.3)
PROTEIN, TOTAL: 7.4 G/DL (ref 6.1–8.1)
RDW: 13.7 % (ref 11–15)
RED BLOOD CELL COUNT: 5.13 MILLION/UL (ref 4.2–5.8)
SODIUM: 138 MMOL/L (ref 135–146)
WHITE BLOOD CELL COUNT: 10.2 THOUSAND/UL (ref 3.8–10.8)

## 2025-03-24 ENCOUNTER — PATIENT MESSAGE (OUTPATIENT)
Dept: RHEUMATOLOGY | Facility: CLINIC | Age: 40
End: 2025-03-24

## 2025-03-26 NOTE — TELEPHONE ENCOUNTER
Hi could you try mobile phone? And talk to patient about the letter? It sounds reasonable given his scleroderma. He has a tough time using his hands given his skin thickening/tightening of the hands. He was diagnosed with diffuse scleroderma in 2020.

## 2025-03-27 NOTE — TELEPHONE ENCOUNTER
Spoke with patient. States he needs a letter for court scheduled for April 28th that explains when symptoms started and the impact it has on him with regards to working. Is currently in the process in obtaining disability. Has not paid child support in 2 years due to symptoms and not working. States symptoms started in 2020 was dismissed 7/2/20.Was a  on a construction truck.  Started with hand swelling, red patches to face and having difficulty in breathing. Now symptoms include  swelling to feet and having difficulty in standing. Today states bilateral hands are stiff and are yellow in color. Currently experiences shortness of breath and hands \"locking\". Currently taking medications for symptoms.     Informed patient office will draft a letter for Dr. Douglas to review and sign. Once completed it will be sent to [facility]. Understanding verbalized.

## 2025-03-27 NOTE — TELEPHONE ENCOUNTER
Triage pool. Please call pt at number listed below to get more info.     Levon, see thread below; this should have been routed to triage pool.    02-Aug-2018 20:03

## 2025-03-28 NOTE — TELEPHONE ENCOUNTER
That sounds good. 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

## 2025-05-21 ENCOUNTER — OFFICE VISIT (OUTPATIENT)
Dept: RHEUMATOLOGY | Facility: CLINIC | Age: 40
End: 2025-05-21
Payer: MEDICAID

## 2025-05-21 VITALS
DIASTOLIC BLOOD PRESSURE: 76 MMHG | SYSTOLIC BLOOD PRESSURE: 100 MMHG | OXYGEN SATURATION: 97 % | BODY MASS INDEX: 27.01 KG/M2 | HEIGHT: 76 IN | RESPIRATION RATE: 16 BRPM | WEIGHT: 221.81 LBS | TEMPERATURE: 97 F | HEART RATE: 108 BPM

## 2025-05-21 DIAGNOSIS — L98.499 ULCER OF FINGER, UNSPECIFIED ULCER STAGE (HCC): ICD-10-CM

## 2025-05-21 DIAGNOSIS — R06.09 DOE (DYSPNEA ON EXERTION): ICD-10-CM

## 2025-05-21 DIAGNOSIS — I42.9 CARDIOMYOPATHY, UNSPECIFIED TYPE (HCC): ICD-10-CM

## 2025-05-21 DIAGNOSIS — D84.821 IMMUNODEFICIENCY DUE TO DRUG THERAPY (HCC): ICD-10-CM

## 2025-05-21 DIAGNOSIS — Z79.899 IMMUNODEFICIENCY DUE TO DRUG THERAPY (HCC): ICD-10-CM

## 2025-05-21 DIAGNOSIS — M34.9 SCLERODERMA (HCC): Primary | ICD-10-CM

## 2025-05-21 PROCEDURE — 99214 OFFICE O/P EST MOD 30 MIN: CPT | Performed by: INTERNAL MEDICINE

## 2025-05-21 RX ORDER — MUPIROCIN 20 MG/G
1 OINTMENT TOPICAL 3 TIMES DAILY
Qty: 30 G | Refills: 3 | Status: SHIPPED | OUTPATIENT
Start: 2025-05-21

## 2025-05-21 NOTE — PROGRESS NOTES
Rheumatology f/u Patient Note  ====================================================================================================  Chief complaint: Scleroderma  ?  PCP  JILLIAN BUTCHER II    =====================================================================================================  HPI 2/2022    Stefan Johnson JrArabella is a 39 year old male   Prior Pulmonary (Dr. Johnson)   Prior Rheum (Dr. Amro, [facility])  Patient here to establish care for scleroderma.  Initially was diagnosed with diffuse scleroderma in January 2020 at [facility].  He was found to have Raynaud's (since end 2018), sclerodactyly, diffuse skin thickening, puffy fingers, GERD, and digital ulcers.  Serologies were consistent with a positive OPAL at 1: 320 and RNA polymerase III and he was started on mycophenolate for skin thickening.  In 2021: He had an echocardiogram which showed an EF of 41% and no TR.  He had PFTs that showed a restrictive defect but normal DLCO.  Immunosuppression:  MMF 1500mg PO BID (started 01/2020, increased to 1500mg in 06/2020, ran out 09/2020-03/2021 because insurance lapse). He has restarted CellCept just 3 days ago after seeing his PCP.  Previously worked as a   Still smoking 2 cigarillos each day.. Previously on chantix, this did not help  EtOH: 2 beers/day.  Used to smoke cannabis.  Stopped  CARVAJAL after moderate activity.   Daughters: 17, 14, 6.   Previously a .   Diarrhea on cellcept.   GERD- bad, used to be on protonix  Raynauds- active  Constipation- none  Skin thickening-yes  Oral ulcers- no  Digital ulcers- in the past, last in 2020  Kidney problems-no. Takes BP  Joint pains/swelling-no  Medications:  MMF 1 gm BID  Losartan 100 mg/HCTZ 12.5 mg  ==============================================================================================================  Visit: 09/11/24  Nuclear stress test was negative. CARVAJAL with cutting the grass.  Feels like food gets stuck in his throat/chest.  Often eats  dinner then falls asleep within 30 minutes.  Obtain Protonix from [facility] pharmacy which is cheaper.  Cannot increase to twice daily dosing.  Having some financial issues.  -Did start sildenafil 20 mg 3 times daily which did help with digital ulcers.  Ran out of this.  -cigarillo: 1 per day, but only 4 puffs.  GERD- cannot get protonix, insurance will not cover.  This is from [facility].  Active.  Raynauds- still active from time to time but better with amlodipine.  Better in the summer.  Takes sildenafil as needed.  Constipation- no  Skin thickening-stable  Oral ulcers- no  Digital ulcers- yes as above, improving  Kidney problems-no  Joint pains/swelling-stable  5 point ROS negative except noted above  ==============================================================================================================  Visit: 01/22/25  Doing fairly well overall.  Still with calcinosis/healing digital ulcer overlying the dorsum of the right PIP 5.  Left PIP 5 ulcer is healing.  Smoking but is decreased to 3 cigarillos, 5 puffs each.  -Does have some unusual sensation in the feet particularly with sitting.  Not necessarily painful sensation.  When standing and walking that issue resolves.  -continues on myfortic 1080 BID and leflunomide 20 mg daily  GERD-improved with Protonix.  This is from [employer].  Raynauds-good on amlodipine and sildenafil and losartan  constipation- no  Skin thickening-skin thickening improving overall but left side of face appetite  Oral ulcers- no  Digital ulcers- yes as above, improving  Kidney problems-no  Joint pains/swelling-stable  No new or worsening dyspnea on exertion symptoms or shortness of breath  ==============================================================================================================  Today's Visit: 05/21/25    He experiences shortness of breath, particularly noticeable when getting out of the shower, upon waking, and during physical activities  such as cutting grass. The sensation is described as an 'overwhelming feeling in my chest' with pressure and some shortness of breath, which improves after resting for about ten minutes. He notes that the sensation is less severe during other activities. No significant changes in breathing test results from a few months ago.  - Recent echocardiogram with slightly decreased EF at 55-55%.  Hypokinesis of the mid apical/inferior lateral wall was seen.  Possible abnormal relaxation, grade 1 diastolic dysfunction seen saw cardiology.  CT calcium scoring was negative for any atherosclerotic disease.  Did have an event monitor that was negative for any arrhythmias.  - Cardiology referred him to GI to evaluate for any gastrointestinal issues that might be causing the intermittent chest pain and discomfort.    He is currently taking several medications including amlodipine, hydroxychloroquine, leflunomide, metoprolol, losartan, mycophenolate (three pills twice a day), pantoprazole once a day, and sildenafil (one pill three times a day). He mentions that he ran out of sildenafil but plans to refill it soon. He obtains his medications through a pharmacy that offers them at a reduced cost.    He has a history of finger ulcers, with only 1 residual ulcer.  The rest of the ulcers resolved.  Had some issues getting mupirocin cream.  Did not seek podiatry and he received some sort of cream that he is using on the feet.  The foot ulcers are still present but not infected appearing and not too painful.  Certain shoes aggravate the issues.    He experiences tingling in his fingers, particularly in the morning, and notes that it worsens with cold exposure.  Pending EMG/NCS      5 point ROS negative except noted above  I had reviewed past medical and family histories together with allergy and medication lists documented.        Medications:  Outpatient Medications Marked as Taking for the 5/21/25 encounter (Office Visit) with Misael  MD Judd   Medication Sig Dispense Refill    mupirocin 2 % External Ointment Apply 1 Application topically 3 (three) times daily. Apply on finger ulcer until resolved 30 g 3    leflunomide 20 MG Oral Tab Take 1 tablet (20 mg total) by mouth daily. 90 tablet 0    mycophenolate sodium 360 MG Oral Tab EC TAKE 3 TABLETS BY MOUTH TWICE DAILY BEFORE MEALS 540 tablet 0    hydroxychloroquine 200 MG Oral Tab TAKE 1 TABLET BY MOUTH TWICE DAILY 180 tablet 1    sildenafil 20 MG Oral Tab Take 1 tablet (20 mg total) by mouth 3 (three) times daily. 270 tablet 3    AMLODIPINE 10 MG Oral Tab TAKE 1 TABLET(10 MG) BY MOUTH DAILY 90 tablet 3    metoprolol succinate  MG Oral Tablet 24 Hr Take 1 tablet (100 mg total) by mouth daily.      pantoprazole 40 MG Oral Tab EC Take 1 tablet (40 mg total) by mouth every morning before breakfast. 90 tablet 3    losartan 100 MG Oral Tab Take 1 tablet (100 mg total) by mouth daily. 90 tablet 1     Modified Medications    No medications on file     There are no discontinued medications.        ?  Allergies:  No Known Allergies      Objective    Vitals:    05/21/25 1311   BP: 100/76   Pulse: 108   Resp: 16   Temp: 96.9 °F (36.1 °C)   SpO2: 97%   Weight: 221 lb 12.8 oz (100.6 kg)   Height: 6' 4\" (1.93 m)     GEN: NAD, well-nourished.   HEENT: Head: NCAT. Face: No lesions. Eyes: Conjunctiva clear. Sclera are anicteric. PERRLA. EOMs are full.   -4.5 fingerbredth oral aperture    CV: RRR, no mrg, S1/S2  PULM:  CTAB, easy effort  Extremities: No cyanosis, edema or deformities.   Neurologic: Strength, CN2-12 grossly intact   Psych: normal affect.   Skin:   1+ skin thickening overlying anterior chest, in bilateral forearms between MCPs/PIPs and PIPs/DIPs.  Otherwise skin throughout is soft    MSK: 28 joint count performed. No evidence of synovitis in mcp, pip, dip, wrist, elbows, shoulders, hips, knees, ankles, mtp unless otherwise noted. Full ROM of elbows, wrists, knees.     0 tender and swollen  joints    Calcinosis cutis noted overlying dorsum of right PIP 5.  Healing dorsal PIP 5 ulceration on the right hand.  Rest of dorsal PIP ulcerations noted  Hammertoes noted bilaterally, some denudation of the PIPs.  Healing ulcer overlying the left PIP 2 of the toe without any drainage or swelling      Labs:  4/15/2025 (Care Everywhere)  Creatinine 1.12, rest of CMP WNL  TSH 6.95  CBC W differential WNL  Sed rate is 45  SCL 70, centromere is negative  OPAL 1: 160 speckled  C3/C4 WNL    10/10/2024  Creatinine 1.03, rest of CMP WNL  CBC W differential WNL    7/2024  A1c 5.3  Vitamin D 17  B12/folate WNL  Ferritin 84  CBC W differential WNL  Creatinine 1.04, rest of CMP WNL  SPEP/LINUS WNL  CK4 52  Mag 2.1  Sed rate 17  CRP 6.1 mg/dL  UPCR 46/188  U/A WNL    4/4/2024  Creatinine 1.15, rest of CMP WNL  TSH WNL  CBC W differential WNL      9/2023  UPCR 45/286  CMP, CBC W differential WNL    Additional Labs:    Lab Results   Component Value Date    WBC 10.2 03/10/2025    RBC 5.13 03/10/2025    HGB 14.4 03/10/2025    HCT 45.2 03/10/2025     (H) 03/10/2025    MCV 88.1 03/10/2025    MCH 28.1 03/10/2025    MCHC 31.9 (L) 03/10/2025    RDW 13.7 03/10/2025    NEPERCENT 69 03/10/2025    LYPERCENT 18.6 03/10/2025    MOPERCENT 11.4 03/10/2025    EOPERCENT 0.4 03/10/2025    BAPERCENT 0.6 03/10/2025    NE 7,038 03/10/2025    LYMABS 1,897 03/10/2025    MOABSO 1,163 (H) 03/10/2025    EOABSO 41 03/10/2025    BAABSO 61 03/10/2025     Lab Results   Component Value Date     (H) 03/10/2025    BUN 13 03/10/2025    BUNCREA SEE NOTE: 03/10/2025    CREATSERUM 1.18 03/10/2025    GFRNAA 83 04/08/2022    GFRAA 97 04/08/2022    CA 10.1 03/10/2025    ALKPHO 83 03/10/2025    AST 19 03/10/2025    ALT 13 03/10/2025    BILT 0.4 03/10/2025    TP 7.4 03/10/2025    ALB 4.4 03/10/2025    GLOBULIN 3.0 03/10/2025    AGRATIO 1.5 03/10/2025     03/10/2025    K 4.1 03/10/2025     03/10/2025    CO2 27 03/10/2025         2/2023  CK3  54  UPCR 12/190  U/A WNL  CRP 2.1 mg/L  Sed rate 28  Creatinine 1.27, rest of CMP WNL  CBC with differential WNL    4/2022  comprehensive metabolic panel (CMP) and complete blood count with differential (CBC w/ diff) wn    2/2022 Labs:     Sed rate 6  CRP 1.1 mg/L  Urine protein 24  Urine creatinine 162  U/a with trace protein, no RBCs or WBCs  WBC 8.2, hemoglobin 15.9, platelets 319  Creatinine 1.29, calcium 11.0, albumin 5.0, rest of CMP normal    ILD Serologies: RNA natalie III elevated, OPAL 1:320 (speckled). Scl-70, SSA, SSB, Sm, RNP negative. Myomarker negative.        4/2021  EKG wnl    4/2021 TTE: [facility]           PFT (02/14/2020, at [facility] and scanned to media tab): Moderate restrictive ventilatory defect with normal diffusing capacity  FVC 4.20/68 -> 4.02/66 (-4% post-BD)  FEV1 3.43/74 -> 3.39/74 (-1% post-BD)  Ratio 82 -> 84  TLC 5.27/62  FRC 2.86/65  RV 1.08/46  DLCO 30.5/89      (ABNORMAL) Complete PFT (04/19/2022 1:18 PM CDT)  (ABNORMAL) Complete PFT (04/19/2022 1:18 PM CDT)   Component Value Ref Range Test Method Analysis Time Performed At Pathologist Signature   FVC_PRE 4.29 (L) 4.86 - 7.30 L     [facility]     FEV1_PRE 3.46 (L) 3.64 - 5.45 L     [facility]     FEV1/FVC_PRE 80.61 61.65 - 87.28 %     [facility]     DLCO_SB_PRE 29.06 27.32 - 43.90 ml/(min*mmHg)     [facility]      8/2023 PFTs  Findings:  FEV1 is 3.48L, 80% predicted.  FVC is 4.12L, 76% predicted.  FEV1/ FVC ratio is 0.85.  The flow-volume loop demonstrates a normal pattern.  The TLC is 6.11L, 73% predicted.  The residual volume 1.98L, 93% predicted.  The diffusion capacity is 63% predicted and 102% predicted when corrected for alveolar volume.     2/2025 PFTs  Findings:  Prebronchodilator FEV1 is 3.67L, z-score -1.73.  Prebronchodilator FVC is 4.32L, z-score -2.12.                           FEV1/ FVC ratio is 85 %, z-score 1.00.   The flow-volume loop demonstrates a normal pattern.  The TLC is 5.84L, z-score -2.83. The residual  volume 1.50L, z-score -0.81.   The diffusion capacity is 27.69, z-score -1.84 and 0.73 when corrected for alveolar volume.     Impression:  Mild decrease in FEV1 and FVC without obstruction.  There is moderate z-score (-2.51 to -4.0) restriction.  This can be seen in heart failure, fluid overload states, interstitial lung disease, thoracic spine/chest disorders, obesity as well as other clinical scenarios.  Further clinical correlation required.       Diffusion capacity is mild (z-score -1.65 to -2.5).  DLCO improves to normal when considering alveolar volume. This may represent a normal finding but can be seen in emphysema, interstitial lung disease, pulmonary vascular disease (such as pulmonary hypertension) and anemia. If not already performed, would suggest further evaluation as determined clinically     No significant change from previous PFT on 8/2/2023  Radiology:    3/2022 HRCT: wnl  3/4/2022: TTE: wnl. Grade 1 diastolic dysfunction.   8/2023: TTE: Doppler parameters are consistent with abnormal left      ventricular relaxation - grade 1 diastolic dysfunction. PASP: 29.     2/2025 TTE  Conclusions:   Left ventricle: The cavity size was normal. Wall thickness was normal.   Systolic function was at the lower limits of normal. The estimated ejection   fraction was 50-55%.  Hypokinesis of the mid-apicalinferolateral wall.   Doppler parameters are consistent with abnormal left ventricular relaxation   - grade 1 diastolic dysfunction.     2/2025 Holter wnl    CCTA 4/10/2025 with nonobstructive coronary ar my gosh teries  No okay yeah 79-year-old but it cN0 I will be all within will cover today that he tried Fosamax to think about something else I guess you have a bit of TZ in the fistula do the shots did not do well and Fosamax is nothing else vielka vora  2/16/2024  CT chest, small pieripheral bleds, no ILD noted. Diverticulsosis without diverticulitis noted.  Radiology review:       =====================================================================================================  Assessment and Plan  Assessment:  1. Scleroderma (HCC)    2. Ulcer of finger, unspecified ulcer stage (HCC)    3. Cardiomyopathy, unspecified type (HCC)    4. 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  Previously followed by rheumatology and pulmonary at [facility].  Clinical Manifestations: Raynaud's (since late 2018), sclerodactyly, diffuse skin thickening, puffy fingers, GERD, and digital ulcers, cardiomyopathy of unclear etiology (EF 41% in 2021 [normal EF in 2023], no TR noted on TTE), restrictive defect on PFTs (no ILD on HRCT in 2022 or CT chest from 2/2024).   Serologies/Laboratory findings:  OPAL at 1:320 and RNA polymerase III   Medications:  MMF 1500mg PO BID (started 01/2020, increased to 1500mg in 06/2020, ran out since 09/2020-03/2021 because insurance lapse), then was out of medication for another several months. He has restarted CellCept 1gm BID 2/2022 but had issues with diarrhea. Now on myfortic 1080 mg BID (since 2/2022) which he is tolerating.     #Sclerodactyly: Improving  -Flexion contracture stable in the hands and feet  -It appears that flexion contracture of the feet is causing friction overlying the dorsum of the toes with shoes    #cervical radiculopathy: Suspect.  Perhaps postural given new pillow was purchased.    #Digital ulcers:  -improving with time with amlodipine + sildenafil, only right PIP 5 and right foot PIP 2 ulcers remain  -Component of calcinosis overlying right PIP 5    #Raynaud's:   -Good with amlodipine + losartan +prn sildanefil.     #Seronegative rheumatoid arthritis: In remission    #GERD:   -Controlled with daily PPI    #Dyspnea on exertion: primarily with cutting grass. TTE/PFT/nuclear stress test (from 8/2024) all stable or normal.   He does have a grass allergy.  Perhaps he has had more dyspnea with environmental allergy exposure.  -continues to f/u with pulmonary  - Hypokinesis of the mid apical inferolateral wall with slight decline in the ejection fraction.  Has seen cardiology who was evaluated him; testing all negative    High risk medication labs including CMP and CBC w/ diff reviewed from 4/25 results are stable. Ophth: Andres Whitehead: exam from 2023 wnl.  Hepatitis B surface antigen, HCV negative in 2020    Plan:  -get blood work every 3 months for monitoring while on leflunomide and Myfortic.   -continue amlodipine 10 mg daily and losartan 100 mg daily and sildenafil 20 mg 3 times daily for Raynauds and digital ulcers.  Patient needs to get a new refill of sildenafil 20 mg 3 times daily which will be important for digital ulcer healing  -Trial mupirocin ointment  for healing digital ulcers/calcinosis of the right 5th PIP and left 2nd toe.  -continue leflunomide 20 mg daily  -cont hydroxychloroquine (plaquenil) 200 mg twice daily for inflammatory arthritis; see Dr. Campos again for retinal monitoring  -continue myfortic (mycophenolic sodium) 1080 mg (3 pills) in the morning and  1080 mg (3 pills) in the evening.      Cardiac MRI to evaluate for any fibrosis of the heart given cardiomyopathy/scleroderma given negative cardiopulmonary studies to date    -continue pantoprazole for Ssc GERD;   ---continue sending Rx to [facility] pharmacy given insurance issues.    -Previously gave phone number to smoking cessation clinic.  Quitting smoking will help with Raynaud's and digital ulcers.    -Recent PFTs in 2025 are stable; HRCT from 2/2024 without any ILD;     Pending EMG/NCS of the BLE to evaluate for potential neuropathy of the bilateral feet.  No localized arthralgia noted but patient notes unusual sensation in the feet with certain positions.  Query radiculopathy    -Discussed previously, patient defers vaccinations for flu, COVID,  pneumococcal vaccine.    -rtc 4 months      Diagnoses and all orders for this visit:    Scleroderma (HCC)  -     mupirocin 2 % External Ointment; Apply 1 Application topically 3 (three) times daily. Apply on finger ulcer until resolved  -     Comp Metabolic Panel (14); Future  -     CBC With Differential With Platelet; Future    Ulcer of finger, unspecified ulcer stage (HCC)  -     mupirocin 2 % External Ointment; Apply 1 Application topically 3 (three) times daily. Apply on finger ulcer until resolved  -     Comp Metabolic Panel (14); Future  -     CBC With Differential With Platelet; Future    Cardiomyopathy, unspecified type (HCC)  -     mupirocin 2 % External Ointment; Apply 1 Application topically 3 (three) times daily. 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      The above plan of care, diagnosis, orders, and follow-up were discussed with the patient. Questions related to this recommended plan of care were answered.    Thank you for referring this delightful patient to me. Please feel free to contact me with any questions.     This report was performed utilizing speech recognition software technology. Despite proofreading, speech recognition errors could escape detection. If a word or phrase is confusing or out of context, please do not hesitate to call for   clarification.       Kind regards      Judd Douglas MD  [facility]

## 2025-05-21 NOTE — PATIENT INSTRUCTIONS
[facility]  Dr. Campos  Main Number: [phone]        For carpal tunnel syndrome, use carpal tunnel syndrome wrist splints/braces. To be worn nightly for at least 3 months consecutively.       Areas affected in carpal tunnel syndrome:          Carpal tunnel syndrome Splint/brace        Dago HAYWARD, Iván MA, Michael BE. Non-operative Treatment of Carpal Tunnel Syndrome. Curr Rev Musculoskelet Med. 2020 Apr;13(2):141-147. doi: 10.1007/s29401-161-26740-0. PMID: 77526874; PMCID: [id number].

## 2025-05-21 NOTE — H&P
The following individual(s) verbally consented to be recorded using ambient AI listening technology and understand that they can each withdraw their consent to this listening technology at any point by asking the clinician to turn off or pause the recording:    Patient name: Stefan Johnson Jr.  Additional names:

## 2025-05-23 DIAGNOSIS — K29.70 GASTRITIS WITHOUT BLEEDING, UNSPECIFIED CHRONICITY, UNSPECIFIED GASTRITIS TYPE: ICD-10-CM

## 2025-05-23 DIAGNOSIS — M34.9 SCLERODERMA (HCC): ICD-10-CM

## 2025-05-23 DIAGNOSIS — M06.00 SERONEGATIVE RHEUMATOID ARTHRITIS (HCC): ICD-10-CM

## 2025-05-23 RX ORDER — LEFLUNOMIDE 20 MG/1
20 TABLET ORAL DAILY
Qty: 90 TABLET | Refills: 0 | Status: SHIPPED | OUTPATIENT
Start: 2025-05-23

## 2025-05-23 NOTE — TELEPHONE ENCOUNTER
Last office visit: 05/21/2025    Next Rheum Apt:10/8/2025 Judd Douglas MD    Last fill: 02/26/2025    QTY: 90    Refills: 0    Labs:   Lab Results   Component Value Date    CREATSERUM 1.18 03/10/2025    ALKPHO 83 03/10/2025    AST 19 03/10/2025    ALT 13 03/10/2025    BILT 0.4 03/10/2025    TP 7.4 03/10/2025    ALB 4.4 03/10/2025       Lab Results   Component Value Date    WBC 10.2 03/10/2025    HGB 14.4 03/10/2025     (H) 03/10/2025    NEPERCENT 69 03/10/2025    LYPERCENT 18.6 03/10/2025    NE 7,038 03/10/2025    LYMABS 1,897 03/10/2025

## 2025-05-27 DIAGNOSIS — M06.00 SERONEGATIVE RHEUMATOID ARTHRITIS (HCC): ICD-10-CM

## 2025-05-27 DIAGNOSIS — K29.70 GASTRITIS WITHOUT BLEEDING, UNSPECIFIED CHRONICITY, UNSPECIFIED GASTRITIS TYPE: ICD-10-CM

## 2025-05-27 DIAGNOSIS — M34.9 SCLERODERMA (HCC): ICD-10-CM

## 2025-05-27 RX ORDER — MYCOPHENOLIC ACID 360 MG/1
TABLET, DELAYED RELEASE ORAL
Qty: 540 TABLET | Refills: 0 | Status: SHIPPED | OUTPATIENT
Start: 2025-05-27

## 2025-05-27 NOTE — TELEPHONE ENCOUNTER
Mycophenolate 360 mg      Last office visit: 5/21/2025    Next Rheum Apt:10/8/2025 Judd Douglas MD    Last fill: 2/26/2025 540 tab, 0 refills    Labs:   Lab Results   Component Value Date    CREATSERUM 1.18 03/10/2025    ALKPHO 83 03/10/2025    AST 19 03/10/2025    ALT 13 03/10/2025    BILT 0.4 03/10/2025    TP 7.4 03/10/2025    ALB 4.4 03/10/2025       Lab Results   Component Value Date    WBC 10.2 03/10/2025    HGB 14.4 03/10/2025     (H) 03/10/2025    NEPERCENT 69 03/10/2025    LYPERCENT 18.6 03/10/2025    NE 7,038 03/10/2025    LYMABS 1,897 03/10/2025

## 2025-05-29 DIAGNOSIS — M34.9 SCLERODERMA (HCC): ICD-10-CM

## 2025-05-29 DIAGNOSIS — M06.00 SERONEGATIVE RHEUMATOID ARTHRITIS (HCC): ICD-10-CM

## 2025-05-29 DIAGNOSIS — K29.70 GASTRITIS WITHOUT BLEEDING, UNSPECIFIED CHRONICITY, UNSPECIFIED GASTRITIS TYPE: ICD-10-CM

## 2025-05-29 RX ORDER — LEFLUNOMIDE 20 MG/1
20 TABLET ORAL DAILY
Qty: 90 TABLET | Refills: 0 | OUTPATIENT
Start: 2025-05-29

## 2025-05-29 RX ORDER — MYCOPHENOLIC ACID 360 MG/1
TABLET, DELAYED RELEASE ORAL
Qty: 540 TABLET | Refills: 0 | OUTPATIENT
Start: 2025-05-29

## 2025-06-10 ENCOUNTER — OFFICE VISIT (OUTPATIENT)
Dept: ELECTROPHYSIOLOGY | Facility: HOSPITAL | Age: 40
End: 2025-06-10
Attending: INTERNAL MEDICINE
Payer: MEDICAID

## 2025-06-10 DIAGNOSIS — G62.9 NEUROPATHY: ICD-10-CM

## 2025-06-10 PROCEDURE — 95909 NRV CNDJ TST 5-6 STUDIES: CPT | Performed by: OTHER

## 2025-06-10 PROCEDURE — 95886 MUSC TEST DONE W/N TEST COMP: CPT | Performed by: OTHER

## 2025-06-10 NOTE — PROCEDURES
[facility]  Nerve Conduction & EMG Report                      Toya Brown D.O.  [facility]   EMG department   [address] [place] [id number]  [phone]          Patient name: Stefan Johnson Jr.  YOB: 1985  Referring provider: Dr. Douglas  Reason for study: Evaluate for polyneuropathy or radiculopathy  Brief history: 39 year old male with history of back injury, no with several month history of tingling and burning in the toes.      Sensory and motor nerve conduction studies, and needle EMG:  Please see separate sheet for waveforms and quantitative nerve conduction data, as well as for tabulated form of electromyographic data.      Summary:   1.  The bilateral sural sensory responses were present.  The right 1 was borderline decreased.  The left 1 was decreased in amplitude.  2.  The right tibial motor response was normal.  The left tibial motor response was normal and robust.  3.  The bilateral peroneal motor responses were normal.  4.  Needle EMG using a concentric needle was performed on selected muscles of the right lower extremity.  All muscles tested were normal.    Conclusion:  1.  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  2.  There is no evidence of a right lumbar radiculopathy on this study.      Toya Brown DO  Neurology and Neuromuscular medicine  [facility]

## 2025-06-20 DIAGNOSIS — K29.70 GASTRITIS WITHOUT BLEEDING, UNSPECIFIED CHRONICITY, UNSPECIFIED GASTRITIS TYPE: ICD-10-CM

## 2025-06-20 DIAGNOSIS — M06.00 SERONEGATIVE RHEUMATOID ARTHRITIS (HCC): ICD-10-CM

## 2025-06-20 DIAGNOSIS — M34.9 SCLERODERMA (HCC): ICD-10-CM

## 2025-06-20 RX ORDER — HYDROXYCHLOROQUINE SULFATE 200 MG/1
TABLET, FILM COATED ORAL
Qty: 180 TABLET | Refills: 1 | Status: SHIPPED | OUTPATIENT
Start: 2025-06-20

## 2025-06-20 NOTE — TELEPHONE ENCOUNTER
Last office visit: 05/21/2025    Next Rheum Apt:10/8/2025 Judd Douglas MD    Last fill: 02/25/2025           QTY: 180           Refills: 0    EYE EXAM: NONE      Labs: CBC with Differential  Order: [id number]   suggestion  Information displayed in this report may not trend or trigger automated decision support.     Component  Ref Range & Units 2 mo ago   WBC  3.5 - 10.5 10ˆ3/µL 7.8   RBC  4.30 - 5.80 10ˆ6/µL 4.96   HGB  13.0 - 17.5 g/dL 14.2   HCT  38.0 - 50.0 % 44.2   MCV  80.0 - 99.0 fL 89.1   MCH  27.0 - 34.0 pg 28.6   MCHC  32.0 - 35.5 g/dL 32.1   RDW  11.0 - 15.0 % 15.4 High    PLT  150 - 400 10ˆ3/µL 337   MPV  8.8 - 12.1 fL 9.1   Neutrophils  34.0 - 73.0 % 65   Lymphocytes  15.0 - 50.0 % 20.5   Monocytes  1.0 - 15.0 % 13   Eosinophils  0.0 - 8.0 % 0.5   Basophils  0.0 - 2.0 % 0.6   Immature Granulocytes  No defined reference range % 0.4   Comment: Immature Granulocytes (IG) represents automated enumeration of Metamyelocytes, Myelocytes and Promyelocytes when IG is < 5%. Blasts are not included in IG and reported separately if present.   Absolute Neutrophils  1.5 - 8.0 10ˆ3/µL 5.1   Absolute Lymphocytes  1.0 - 4.0 10ˆ3/µL 1.6   Absolute Monocytes  0.2 - 1.0 10ˆ3/µL 1   Absolute Eosinophils  0.0 - 0.6 10ˆ3/µL 0   Absolute Basophils  0.0 - 0.3 10ˆ3/µL 0.1   Absolute Immature Granulocytes  0.0 - 0.1 10ˆ3/µL 0   Narrative    Reference ranges for nonbinary/intersex or unspecified gender patients have not been established. Please refer to the following table for ranges established for cisgender patients and evaluate in the clinical context of the individual patient:  https://[email or web address].nm.org/[email or web address]    Specimen Collected: 04/14/25 11:33 AM    Performed by: [facility] Last Resulted: 04/14/25 11:14 PM   Received From: [facility]  Result Received: 05/20/25  9:26 PM    View Encounter        Received Information  Comprehensive Metabolic Panel  Order: [id number]   suggestion  Information displayed in  this report may not trend or trigger automated decision support.     Component  Ref Range & Units 2 mo ago   Sodium  133 - 146 mmol/L 138   Potassium  3.5 - 5.1 mmol/L 4.3   Chloride  98 - 107 mmol/L 102   Carbon Dioxide  21 - 31 mmol/L 24   Anion Gap  4 - 13 mmol/L 12   Blood Urea Nitrogen  7 - 25 mg/dL 14   Creatinine  0.60 - 1.30 mg/dL 1.12   eGFRcr (CKD-EPI 2021)  >=60 mL/min/1.73 m² 85   Calcium  8.3 - 10.5 mg/dL 10.4   Glucose  70 - 100 mg/dL 88   Protein, Total  6.4 - 8.3 g/dL 7.6   Albumin  3.5 - 5.0 g/dL 4.6   ALT  11 - 51 units/L 15   Alkaline Phosphatase  34 - 104 units/L 78   AST  13 - 39 units/L 20   Bilirubin, Total  0.2 - 1.2 mg/dL 0.4     Specimen Collected: 04/14/25 11:33 AM    Performed by: [facility] Last Resulted: 04/15/25 12:05 AM   Received From: [facility]  Result Received: 05/20/25  9:26 PM    View Encounter        Received Information   Contains abnormal data Sedimentation Rate  Order: [id number]   suggestion  Information displayed in this report may not trend or trigger automated decision support.     Component  Ref Range & Units 2 mo ago   Sedimentation Rate  0 - 15 mm/Hour 45 High      Specimen Collected: 04/14/25 11:33 AM    Performed by: [facility] Last Resulted: 04/14/25 11:30 PM   Received From: [facility]  Result Received: 05/20/25  9:26 PM      Lab Results   Component Value Date    CREATSERUM 1.18 03/10/2025    ALKPHO 83 03/10/2025    AST 19 03/10/2025    ALT 13 03/10/2025    BILT 0.4 03/10/2025    TP 7.4 03/10/2025    ALB 4.4 03/10/2025       Lab Results   Component Value Date    WBC 10.2 03/10/2025    HGB 14.4 03/10/2025     (H) 03/10/2025    NEPERCENT 69 03/10/2025    LYPERCENT 18.6 03/10/2025    NE 7,038 03/10/2025    LYMABS 1,897 03/10/2025

## 2025-07-19 LAB
ABSOLUTE BASOPHILS: 41 CELLS/UL (ref 0–200)
ABSOLUTE EOSINOPHILS: 82 CELLS/UL (ref 15–500)
ABSOLUTE LYMPHOCYTES: 1894 CELLS/UL (ref 850–3900)
ABSOLUTE MONOCYTES: 968 CELLS/UL (ref 200–950)
ABSOLUTE NEUTROPHILS: 5215 CELLS/UL (ref 1500–7800)
ALBUMIN/GLOBULIN RATIO: 1.5 (CALC) (ref 1–2.5)
ALBUMIN: 4.3 G/DL (ref 3.6–5.1)
ALKALINE PHOSPHATASE: 78 U/L (ref 36–130)
ALT: 13 U/L (ref 9–46)
AST: 18 U/L (ref 10–40)
BASOPHILS: 0.5 %
BILIRUBIN, TOTAL: 0.3 MG/DL (ref 0.2–1.2)
BUN: 13 MG/DL (ref 7–25)
CALCIUM: 9.9 MG/DL (ref 8.6–10.3)
CARBON DIOXIDE: 27 MMOL/L (ref 20–32)
CHLORIDE: 103 MMOL/L (ref 98–110)
CREATININE: 1.23 MG/DL (ref 0.6–1.29)
EGFR: 76 ML/MIN/1.73M2
EOSINOPHILS: 1 %
GLOBULIN: 2.8 G/DL (CALC) (ref 1.9–3.7)
GLUCOSE: 94 MG/DL (ref 65–99)
HEMATOCRIT: 42.5 % (ref 38.5–50)
HEMOGLOBIN: 13.8 G/DL (ref 13.2–17.1)
LYMPHOCYTES: 23.1 %
MCH: 29 PG (ref 27–33)
MCHC: 32.5 G/DL (ref 32–36)
MCV: 89.3 FL (ref 80–100)
MONOCYTES: 11.8 %
MPV: 9.1 FL (ref 7.5–12.5)
NEUTROPHILS: 63.6 %
PLATELET COUNT: 322 THOUSAND/UL (ref 140–400)
POTASSIUM: 4.1 MMOL/L (ref 3.5–5.3)
PROTEIN, TOTAL: 7.1 G/DL (ref 6.1–8.1)
RDW: 13.8 % (ref 11–15)
RED BLOOD CELL COUNT: 4.76 MILLION/UL (ref 4.2–5.8)
SODIUM: 139 MMOL/L (ref 135–146)
WHITE BLOOD CELL COUNT: 8.2 THOUSAND/UL (ref 3.8–10.8)

## 2025-08-01 DIAGNOSIS — I10 PRIMARY HYPERTENSION: ICD-10-CM

## 2025-08-01 DIAGNOSIS — I73.00 RAYNAUD'S DISEASE WITHOUT GANGRENE: ICD-10-CM

## 2025-08-01 DIAGNOSIS — M06.00 SERONEGATIVE RHEUMATOID ARTHRITIS (HCC): ICD-10-CM

## 2025-08-01 DIAGNOSIS — M34.9 SCLERODERMA (HCC): Primary | ICD-10-CM

## 2025-08-02 RX ORDER — AMLODIPINE BESYLATE 10 MG/1
10 TABLET ORAL DAILY
Qty: 90 TABLET | Refills: 3 | Status: SHIPPED | OUTPATIENT
Start: 2025-08-02

## 2025-08-04 DIAGNOSIS — Z51.81 THERAPEUTIC DRUG MONITORING: ICD-10-CM

## 2025-08-04 DIAGNOSIS — J84.9 ILD (INTERSTITIAL LUNG DISEASE) (HCC): ICD-10-CM

## 2025-08-04 DIAGNOSIS — K29.70 GASTRITIS WITHOUT BLEEDING, UNSPECIFIED CHRONICITY, UNSPECIFIED GASTRITIS TYPE: Primary | ICD-10-CM

## 2025-08-04 DIAGNOSIS — I73.00 RAYNAUD'S DISEASE WITHOUT GANGRENE: ICD-10-CM

## 2025-08-04 DIAGNOSIS — M34.9 SCLERODERMA (HCC): ICD-10-CM

## 2025-08-04 RX ORDER — PANTOPRAZOLE SODIUM 40 MG/1
40 TABLET, DELAYED RELEASE ORAL
Qty: 90 TABLET | Refills: 3 | Status: SHIPPED | OUTPATIENT
Start: 2025-08-04

## 2025-08-08 ENCOUNTER — HOSPITAL ENCOUNTER (OUTPATIENT)
Dept: MRI IMAGING | Facility: HOSPITAL | Age: 40
Discharge: HOME | End: 2025-08-08
Attending: INTERNAL MEDICINE

## 2025-08-08 ENCOUNTER — LAB ENCOUNTER (OUTPATIENT)
Dept: LAB | Facility: HOSPITAL | Age: 40
End: 2025-08-08
Attending: INTERNAL MEDICINE

## 2025-08-08 DIAGNOSIS — I42.9 CARDIOMYOPATHY, UNSPECIFIED TYPE (HCC): ICD-10-CM

## 2025-08-08 LAB — HCT VFR BLD AUTO: 38.2 % (ref 39–53)

## 2025-08-08 PROCEDURE — 75561 CARDIAC MRI FOR MORPH W/DYE: CPT | Performed by: INTERNAL MEDICINE

## 2025-08-08 PROCEDURE — A9585 GADOBUTROL INJECTION: HCPCS | Performed by: INTERNAL MEDICINE

## 2025-08-08 PROCEDURE — 85014 HEMATOCRIT: CPT | Performed by: INTERNAL MEDICINE

## 2025-08-08 PROCEDURE — 36415 COLL VENOUS BLD VENIPUNCTURE: CPT | Performed by: INTERNAL MEDICINE

## 2025-08-20 DIAGNOSIS — M06.00 SERONEGATIVE RHEUMATOID ARTHRITIS (HCC): ICD-10-CM

## 2025-08-20 DIAGNOSIS — M34.9 SCLERODERMA (HCC): Primary | ICD-10-CM

## 2025-08-20 DIAGNOSIS — K29.70 GASTRITIS WITHOUT BLEEDING, UNSPECIFIED CHRONICITY, UNSPECIFIED GASTRITIS TYPE: ICD-10-CM

## 2025-08-20 RX ORDER — LEFLUNOMIDE 20 MG/1
20 TABLET ORAL DAILY
Qty: 90 TABLET | Refills: 0 | Status: SHIPPED | OUTPATIENT
Start: 2025-08-20

## 2025-08-20 RX ORDER — MYCOPHENOLIC ACID 360 MG/1
TABLET, DELAYED RELEASE ORAL
Qty: 540 TABLET | Refills: 0 | Status: SHIPPED | OUTPATIENT
Start: 2025-08-20

## (undated) DIAGNOSIS — M19.90 INFLAMMATORY ARTHRITIS: ICD-10-CM

## (undated) DIAGNOSIS — M34.9 SCLERODERMA (HCC): ICD-10-CM

## (undated) DIAGNOSIS — Z51.81 THERAPEUTIC DRUG MONITORING: ICD-10-CM

## (undated) NOTE — Clinical Note
Please mail this to the patient:    Scleroderma hand and face stretching exercises:    [employer].. pdf

## (undated) NOTE — Clinical Note
Please see if Myfortic 1080 mg twice daily needs a prior Auth. Once it is approved have the patient stop CellCept 1000 mg twice a day.

## (undated) NOTE — Clinical Note
Can you call over to see what bloodwork was already completed by patient's PCP and let me know what these were?

## (undated) NOTE — Clinical Note
Please make rtc in early June. Please copy and paste below link, print pdf, and mail these [facility] exercises to the patient. -Spine (neck/back) Exercises: [email or web address]. pdf

## (undated) NOTE — Clinical Note
Patient needs PA for protonix (scleroderma and gastritis).  Patient notes insurance not covering PPI anymore.  Patients with scleroderma need to be on lifelong PPI. -Please start prior authorization for sildenafil 20 mg 3 times daily for refractory Raynaud's and digital ulcers despite amlodipine + losartan.